# Patient Record
Sex: FEMALE | Race: BLACK OR AFRICAN AMERICAN | Employment: OTHER | URBAN - METROPOLITAN AREA
[De-identification: names, ages, dates, MRNs, and addresses within clinical notes are randomized per-mention and may not be internally consistent; named-entity substitution may affect disease eponyms.]

---

## 2017-01-10 DIAGNOSIS — M19.90 ARTHRITIS: ICD-10-CM

## 2017-01-10 RX ORDER — TRAMADOL HYDROCHLORIDE 50 MG/1
50 TABLET ORAL
Qty: 60 TAB | Refills: 0 | Status: SHIPPED | OUTPATIENT
Start: 2017-01-10 | End: 2017-04-11 | Stop reason: SDUPTHER

## 2017-01-27 ENCOUNTER — HOSPITAL ENCOUNTER (OUTPATIENT)
Dept: LAB | Age: 67
Discharge: HOME | End: 2017-01-27
Payer: MEDICARE

## 2017-01-27 ENCOUNTER — OFFICE VISIT (OUTPATIENT)
Dept: FAMILY MEDICINE CLINIC | Age: 67
End: 2017-01-27

## 2017-01-27 VITALS
BODY MASS INDEX: 31.02 KG/M2 | DIASTOLIC BLOOD PRESSURE: 80 MMHG | WEIGHT: 193 LBS | TEMPERATURE: 98 F | OXYGEN SATURATION: 99 % | HEART RATE: 60 BPM | HEIGHT: 66 IN | SYSTOLIC BLOOD PRESSURE: 128 MMHG

## 2017-01-27 DIAGNOSIS — I11.9 BENIGN HYPERTENSIVE HEART DISEASE WITHOUT HEART FAILURE: ICD-10-CM

## 2017-01-27 DIAGNOSIS — R73.01 ELEVATED FASTING BLOOD SUGAR: ICD-10-CM

## 2017-01-27 DIAGNOSIS — E55.9 VITAMIN D DEFICIENCY: ICD-10-CM

## 2017-01-27 DIAGNOSIS — R52 PAIN OF MULTIPLE SITES: Primary | ICD-10-CM

## 2017-01-27 DIAGNOSIS — E78.5 DYSLIPIDEMIA: ICD-10-CM

## 2017-01-27 DIAGNOSIS — M19.90 ARTHRITIS: ICD-10-CM

## 2017-01-27 DIAGNOSIS — Z51.81 MEDICATION MONITORING ENCOUNTER: ICD-10-CM

## 2017-01-27 LAB
ALBUMIN SERPL BCP-MCNC: 3.6 G/DL (ref 3.4–5)
ALBUMIN/GLOB SERPL: 1.1 {RATIO} (ref 0.8–1.7)
ALP SERPL-CCNC: 48 U/L (ref 45–117)
ALT SERPL-CCNC: 23 U/L (ref 13–56)
ANION GAP BLD CALC-SCNC: 9 MMOL/L (ref 3–18)
AST SERPL W P-5'-P-CCNC: 14 U/L (ref 15–37)
BASOPHILS # BLD AUTO: 0 K/UL (ref 0–0.06)
BASOPHILS # BLD: 0 % (ref 0–2)
BILIRUB SERPL-MCNC: 0.5 MG/DL (ref 0.2–1)
BUN SERPL-MCNC: 15 MG/DL (ref 7–18)
BUN/CREAT SERPL: 20 (ref 12–20)
CALCIUM SERPL-MCNC: 8.9 MG/DL (ref 8.5–10.1)
CHLORIDE SERPL-SCNC: 104 MMOL/L (ref 100–108)
CHOLEST SERPL-MCNC: 190 MG/DL
CO2 SERPL-SCNC: 30 MMOL/L (ref 21–32)
CREAT SERPL-MCNC: 0.75 MG/DL (ref 0.6–1.3)
DIFFERENTIAL METHOD BLD: ABNORMAL
EOSINOPHIL # BLD: 0.2 K/UL (ref 0–0.4)
EOSINOPHIL NFR BLD: 3 % (ref 0–5)
ERYTHROCYTE [DISTWIDTH] IN BLOOD BY AUTOMATED COUNT: 14 % (ref 11.6–14.5)
GLOBULIN SER CALC-MCNC: 3.4 G/DL (ref 2–4)
GLUCOSE SERPL-MCNC: 92 MG/DL (ref 74–99)
HBA1C MFR BLD: 5.9 % (ref 4.2–5.6)
HCT VFR BLD AUTO: 37.6 % (ref 35–45)
HDLC SERPL-MCNC: 78 MG/DL (ref 40–60)
HDLC SERPL: 2.4 {RATIO} (ref 0–5)
HGB BLD-MCNC: 12 G/DL (ref 12–16)
LDLC SERPL CALC-MCNC: 95.4 MG/DL (ref 0–100)
LIPID PROFILE,FLP: ABNORMAL
LYMPHOCYTES # BLD AUTO: 42 % (ref 21–52)
LYMPHOCYTES # BLD: 2.3 K/UL (ref 0.9–3.6)
MAGNESIUM SERPL-MCNC: 2 MG/DL (ref 1.8–2.4)
MCH RBC QN AUTO: 27.3 PG (ref 24–34)
MCHC RBC AUTO-ENTMCNC: 31.9 G/DL (ref 31–37)
MCV RBC AUTO: 85.5 FL (ref 74–97)
MONOCYTES # BLD: 0.5 K/UL (ref 0.05–1.2)
MONOCYTES NFR BLD AUTO: 8 % (ref 3–10)
NEUTS SEG # BLD: 2.5 K/UL (ref 1.8–8)
NEUTS SEG NFR BLD AUTO: 47 % (ref 40–73)
PLATELET # BLD AUTO: 276 K/UL (ref 135–420)
PMV BLD AUTO: 12.4 FL (ref 9.2–11.8)
POTASSIUM SERPL-SCNC: 3.8 MMOL/L (ref 3.5–5.5)
PROT SERPL-MCNC: 7 G/DL (ref 6.4–8.2)
RBC # BLD AUTO: 4.4 M/UL (ref 4.2–5.3)
SODIUM SERPL-SCNC: 143 MMOL/L (ref 136–145)
TRIGL SERPL-MCNC: 83 MG/DL (ref ?–150)
TSH SERPL DL<=0.05 MIU/L-ACNC: 1.31 UIU/ML (ref 0.36–3.74)
VLDLC SERPL CALC-MCNC: 16.6 MG/DL
WBC # BLD AUTO: 5.5 K/UL (ref 4.6–13.2)

## 2017-01-27 PROCEDURE — 82306 VITAMIN D 25 HYDROXY: CPT | Performed by: FAMILY MEDICINE

## 2017-01-27 PROCEDURE — 83735 ASSAY OF MAGNESIUM: CPT | Performed by: FAMILY MEDICINE

## 2017-01-27 PROCEDURE — 84443 ASSAY THYROID STIM HORMONE: CPT | Performed by: FAMILY MEDICINE

## 2017-01-27 PROCEDURE — 85025 COMPLETE CBC W/AUTO DIFF WBC: CPT | Performed by: FAMILY MEDICINE

## 2017-01-27 PROCEDURE — 80061 LIPID PANEL: CPT | Performed by: FAMILY MEDICINE

## 2017-01-27 PROCEDURE — 80053 COMPREHEN METABOLIC PANEL: CPT | Performed by: FAMILY MEDICINE

## 2017-01-27 PROCEDURE — 83036 HEMOGLOBIN GLYCOSYLATED A1C: CPT | Performed by: FAMILY MEDICINE

## 2017-01-27 PROCEDURE — 36415 COLL VENOUS BLD VENIPUNCTURE: CPT | Performed by: FAMILY MEDICINE

## 2017-01-27 RX ORDER — HYDROCODONE BITARTRATE AND ACETAMINOPHEN 5; 325 MG/1; MG/1
1 TABLET ORAL
Qty: 30 TAB | Refills: 0 | Status: SHIPPED | OUTPATIENT
Start: 2017-01-27 | End: 2017-07-18 | Stop reason: SDUPTHER

## 2017-01-27 NOTE — PROGRESS NOTES
Chief Complaint   Patient presents with    Blood sugar problem     prediabetes    Cholesterol Problem     did not get labs done but states she is fasting and can have them done here    Hypertension    Vitamin D Deficiency    Arthritis     joints, states pain med is effective when she takes it, has not taken it today     1. Have you been to the ER, urgent care clinic since your last visit? Hospitalized since your last visit? No    2. Have you seen or consulted any other health care providers outside of the [address] [place] [address] since your last visit? Include any pap smears or colon screening.  No

## 2017-01-27 NOTE — MR AVS SNAPSHOT
Visit Information Date & Time Provider Department Dept. Phone Encounter #  
 1/27/2017  1:45 PM Franco Mcgarry MD [address] [phone] [id number] Follow-up Instructions Return in about 9 weeks (around 3/31/2017) for AWV. Upcoming Health Maintenance Date Due DTaP/Tdap/Td series (1 - Tdap) 3/15/1972 ZOSTER VACCINE AGE 60> 3/15/2011 GLAUCOMA SCREENING Q2Y 3/15/2016 Pneumococcal 65+ Low/Medium Risk (2 of 2 - PCV13) 3/31/2017 MEDICARE YEARLY EXAM 4/1/2017 COLONOSCOPY 1/28/2018 BREAST CANCER SCRN MAMMOGRAM 10/28/2018 Allergies as of 1/27/2017  Review Complete On: 1/27/2017 By: Franco Mcgarry MD  
  
 Severity Noted Reaction Type Reactions Ampicillin  07/28/2010    Hives Crestor [Rosuvastatin]  07/28/2010    Myalgia Lipitor [Atorvastatin]  07/28/2010    Myalgia Zetia [Ezetimibe]  07/28/2010    Myalgia Current Immunizations  Reviewed on 7/12/2016 Name Date Influenza High Dose Vaccine PF 9/15/2016 Influenza Vaccine 10/8/2015, 12/18/2012 Influenza Vaccine PF 12/10/2014, 10/22/2013 Influenza Vaccine Whole 11/3/2010, 12/1/2008 Pneumococcal Polysaccharide (PPSV-23) 3/31/2016 Not reviewed this visit You Were Diagnosed With   
  
 Codes Comments Vitamin D deficiency    -  Primary ICD-10-CM: E55.9 ICD-9-CM: 268.9 Arthritis     ICD-10-CM: M19.90 ICD-9-CM: 716.90 Pain of multiple sites     ICD-10-CM: R52 ICD-9-CM: 780.96 Dyslipidemia     ICD-10-CM: E78.5 ICD-9-CM: 272.4 Benign hypertensive heart disease without heart failure     ICD-10-CM: I11.9 ICD-9-CM: 402.10 Vitals BP Pulse Temp Height(growth percentile) Weight(growth percentile) SpO2  
 128/80 60 98 °F (36.7 °C) (Oral) 5' 6\" (1.676 m) 193 lb (87.5 kg) 99% BMI OB Status Smoking Status 31.15 kg/m2 Postmenopausal Former Smoker Vitals History BMI and BSA Data Body Mass Index Body Surface Area 31.15 kg/m 2 2.02 m 2 Preferred Pharmacy Pharmacy Name Phone Mateo Morejon, [facility] [phone] Your Updated Medication List  
  
   
This list is accurate as of: 1/27/17  2:35 PM.  Always use your most recent med list.  
  
  
  
  
 aspirin delayed-release 81 mg tablet Take 81 mg by mouth daily. colesevelam 625 mg tablet Commonly known as:  [facility] Take 2 Tabs by mouth two (2) times daily (with meals). ergocalciferol 50,000 unit capsule Commonly known as:  ERGOCALCIFEROL Take 50,000 Units by mouth every seven (7) days. ezetimibe 10 mg tablet Commonly known as:  Zelpha Bock Take 1 Tab by mouth daily. HYDROcodone-acetaminophen 5-325 mg per tablet Commonly known as:  Sylvia Trevon Take 1 Tab by mouth every four (4) hours as needed for Pain.  
  
 hyoscyamine SL 0.125 mg SL tablet Commonly known as:  LEVSIN/SL  
0.125 mg by SubLINGual route every four (4) hours as needed for Cramping. IRON PO Take  by mouth two (2) times a day. lansoprazole 30 mg capsule Commonly known as:  PREVACID Take 1 Cap by mouth Daily (before breakfast). lisinopril 10 mg tablet Commonly known as:  Michaelyn [place] Take 1 Tab by mouth two (2) times a day. nitroglycerin 0.4 mg SL tablet Commonly known as:  NITROSTAT  
1 Tab by SubLINGual route every five (5) minutes as needed. PARoxetine mesylate 7.5 mg Cap Commonly known as:  Thedora Croissant Take 1 Cap by mouth daily. PLAVIX PO Take 75 mg by mouth daily. simvastatin 80 mg tablet Commonly known as:  ZOCOR Take 1 Tab by mouth nightly. TOPROL XL 25 mg XL tablet Generic drug:  metoprolol succinate Take 0.5 Tabs by mouth daily. traMADol 50 mg tablet Commonly known as:  ULTRAM  
Take 1 Tab by mouth two (2) times daily as needed for Pain. VITAMIN B-12 1,000 mcg/mL injection Generic drug:  cyanocobalamin 1,000 mcg by IntraMUSCular route once. Prescriptions Printed Refills HYDROcodone-acetaminophen (NORCO) 5-325 mg per tablet 0 Sig: Take 1 Tab by mouth every four (4) hours as needed for Pain. Class: Print Route: Oral  
  
Follow-up Instructions Return in about 9 weeks (around 3/31/2017) for AWV. Patient Instructions Please contact our office if you have any questions about your visit today. Introducing [facility]! Sherri Marley introduces "[employer] patient portal. Now you can access parts of your medical record, email your doctor's office, and request medication refills online. 1. In your internet browser, go to [email or web address] 2. Click on the First Time User? Click Here link in the Sign In box. You will see the New Member Sign Up page. 3. Enter your "[employer] Access Code exactly as it appears below. You will not need to use this code after youve completed the sign-up process. If you do not sign up before the expiration date, you must request a new code. · "[employer] Access Code: [email or web address] Expires: 3/8/2017  2:44 PM 
 
4. Enter the last four digits of your Social Security Number (xxxx) and Date of Birth (mm/dd/yyyy) as indicated and click Submit. You will be taken to the next sign-up page. 5. Create a "[employer] ID. This will be your "[employer] login ID and cannot be changed, so think of one that is secure and easy to remember. 6. Create a "[employer] password. You can change your password at any time. 7. Enter your Password Reset Question and Answer. This can be used at a later time if you forget your password. 8. Enter your e-mail address. You will receive e-mail notification when new information is available in [address] 9. Click Sign Up. You can now view and download portions of your medical record. 10. Click the Download Summary menu link to download a portable copy of your medical information. If you have questions, please visit the Frequently Asked Questions section of the [employer] website. Remember, [employer] is NOT to be used for urgent needs. For medical emergencies, dial 911. Now available from your iPhone and Android! Please provide this summary of care documentation to your next provider. Your primary care clinician is listed as SALINAS ALMARAZ. If you have any questions after today's visit, please call [phone].

## 2017-01-27 NOTE — PROGRESS NOTES
HISTORY OF PRESENT ILLNESS  Carter Patino is a 73 y.o. female. Chief Complaint   Patient presents with    Blood sugar problem chronic problem, stable      prediabetes    Cholesterol Problem Chronic problem, control uncertain, labs past due. At times uncontrolled       did not get labs done but states she is fasting and can have them done here    Hypertension    Vitamin D Deficiency    Arthritis     joints, states pain med is effective when she takes it, has not taken it today. Worse with cold weather, norco for more severe pain, ultram for milder pain, both are effective using prn pain level 10/10 today with cold today achy. HPI  Past Medical History   Diagnosis Date    Abnormal nuclear cardiac imaging test 03/23/2009     Moderate inferior infarction w/o ischemia. Mild anterior artifact. EF 55%. Mild inferior hypk. Neg EKG on max EST. Ex time 7:30.    Arthritis     Breast cancer [facility])      resection '06;  chemotherapy;  XRT    Coronary artery disease      RCA - 2.75 x 15mm ACS ZETA x2 5/03    Dyslipidemia     GERD     History of echocardiogram 11/24/2008     EF 61% (3D). No clear-cut WMA. Gr 1 DDfx. Mild LAE. Mild MR. Mild TR. PASP 40.      Hypertension     Lung cancer [facility])      resection '09;  no chemotherapy,  no XRT    S/P cardiac cath 05/27/2003     [facility]ici:  Prox RCA mild. Prior stent patent. LM  patent. pLAD 20%. CX patent. Current Outpatient Prescriptions   Medication Sig Dispense Refill    traMADol (ULTRAM) 50 mg tablet Take 1 Tab by mouth two (2) times daily as needed for Pain. 60 Tab 0    HYDROcodone-acetaminophen (NORCO) 5-325 mg per tablet Take 1 Tab by mouth every four (4) hours as needed for Pain. 30 Tab 0    cyanocobalamin (VITAMIN B-12) 1,000 mcg/mL injection 1,000 mcg by IntraMUSCular route once.  lansoprazole (PREVACID) 30 mg capsule Take 1 Cap by mouth Daily (before breakfast).  90 Cap 3    ezetimibe (ZETIA) 10 mg tablet Take 1 Tab by mouth daily. 90 Tab 3    simvastatin (ZOCOR) 80 mg tablet Take 1 Tab by mouth nightly. 90 Tab 3    ergocalciferol (ERGOCALCIFEROL) 50,000 unit capsule Take 50,000 Units by mouth every seven (7) days. 0    nitroglycerin (NITROSTAT) 0.4 mg SL tablet 1 Tab by SubLINGual route every five (5) minutes as needed. 30 Tab 1    PARoxetine mesylate (BRISDELLE) 7.5 mg cap Take 1 Cap by mouth daily. 30 Cap 1    hyoscyamine SL (LEVSIN/SL) 0.125 mg SL tablet 0.125 mg by SubLINGual route every four (4) hours as needed for Cramping.  CLOPIDOGREL BISULFATE (PLAVIX PO) Take 75 mg by mouth daily.  colesevelam (WELCHOL) 625 mg tablet Take 2 Tabs by mouth two (2) times daily (with meals). 120 Tab 6    lisinopril (PRINIVIL, ZESTRIL) 10 mg tablet Take 1 Tab by mouth two (2) times a day. 180 Tab 3    TOPROL XL 25 mg XL tablet Take 0.5 Tabs by mouth daily. 45 Tab 3    aspirin delayed-release 81 mg tablet Take 81 mg by mouth daily.  FERROUS FUMARATE (IRON PO) Take  by mouth two (2) times a day. Allergies   Allergen Reactions    Ampicillin Hives    Crestor [Rosuvastatin] Myalgia    Lipitor [Atorvastatin] Myalgia    Zetia [Ezetimibe] Myalgia      ROS Respiratory: Negative for shortness of breath. Cardiovascular: Negative for chest pain. Genitourinary: Negative for frequency. Neurological: Negative for dizziness and headaches. Visit Vitals    /80  Comment: manual    Pulse 60    Temp 98 °F (36.7 °C) (Oral)    Ht 5' 6\" (1.676 m)    Wt 193 lb (87.5 kg)    SpO2 99%    BMI 31.15 kg/m2       Physical Exam  Nursing note and vitals reviewed. Constitutional: She is oriented to person, place, and time. She appears well-developed and well-nourished. No distress. HENT:   Mouth/Throat: Oropharynx is clear and moist.   Neck: No JVD present. No thyromegaly present. Cardiovascular: Normal rate, regular rhythm and normal heart sounds.     Pulmonary/Chest: Effort normal and breath sounds normal. No respiratory distress. She has no wheezes. She has no rales. Musculoskeletal: She exhibits no edema. Lymphadenopathy:     She has no cervical adenopathy. Neurological: She is alert and oriented to person, place, and time. Coordination normal.   Psychiatric: She has a normal mood and affect. Her behavior is normal.     ASSESSMENT and PLAN    ICD-10-CM ICD-9-CM    1. Pain of multiple sites uncontrolled exacerbation of refilled norco R52 780.96 HYDROcodone-acetaminophen (NORCO) 5-325 mg per tablet   2. Arthritis M19.90 716.90 HYDROcodone-acetaminophen (NORCO) 5-325 mg per tablet   3. Hypertension stable continue current medications improved on leticia I11.9 402.10    4. Vitamin D deficiency E55.9 268.9    5. Dyslipidemia Chronic problem, control uncertain, labs past due. E78.5 272.4    6. Elevated fasting blood sugar R73.01 790.21    Labs drawn in office today call back for results  Follow-up Disposition:  Return in about 9 weeks (around 3/31/2017) for AWV.

## 2017-01-28 LAB — 25(OH)D3 SERPL-MCNC: 43.9 NG/ML (ref 30–100)

## 2017-02-01 ENCOUNTER — TELEPHONE (OUTPATIENT)
Dept: FAMILY MEDICINE CLINIC | Age: 67
End: 2017-02-01

## 2017-02-06 ENCOUNTER — TELEPHONE (OUTPATIENT)
Dept: FAMILY MEDICINE CLINIC | Age: 67
End: 2017-02-06

## 2017-02-06 NOTE — TELEPHONE ENCOUNTER
Patient called and stated that she would like an order for an echo cardiogram due to shortness of breath

## 2017-04-10 DIAGNOSIS — M19.90 ARTHRITIS: ICD-10-CM

## 2017-04-13 RX ORDER — TRAMADOL HYDROCHLORIDE 50 MG/1
50 TABLET ORAL
Qty: 60 TAB | Refills: 0 | Status: SHIPPED | OUTPATIENT
Start: 2017-04-13 | End: 2017-06-06 | Stop reason: SDUPTHER

## 2017-04-25 ENCOUNTER — OFFICE VISIT (OUTPATIENT)
Dept: FAMILY MEDICINE CLINIC | Age: 67
End: 2017-04-25

## 2017-04-25 ENCOUNTER — HOSPITAL ENCOUNTER (OUTPATIENT)
Dept: LAB | Age: 67
Discharge: HOME | End: 2017-04-25
Payer: MEDICARE

## 2017-04-25 VITALS
HEART RATE: 54 BPM | RESPIRATION RATE: 20 BRPM | WEIGHT: 198.5 LBS | TEMPERATURE: 97.3 F | HEIGHT: 66 IN | SYSTOLIC BLOOD PRESSURE: 160 MMHG | DIASTOLIC BLOOD PRESSURE: 80 MMHG | BODY MASS INDEX: 31.9 KG/M2

## 2017-04-25 DIAGNOSIS — Z12.11 SCREEN FOR COLON CANCER: ICD-10-CM

## 2017-04-25 DIAGNOSIS — R92.8 ABNORMAL MAMMOGRAM: ICD-10-CM

## 2017-04-25 DIAGNOSIS — E78.5 HYPERLIPIDEMIA, UNSPECIFIED HYPERLIPIDEMIA TYPE: ICD-10-CM

## 2017-04-25 DIAGNOSIS — I11.9 BENIGN HYPERTENSIVE HEART DISEASE WITHOUT HEART FAILURE: ICD-10-CM

## 2017-04-25 DIAGNOSIS — R10.9 RIGHT FLANK PAIN: ICD-10-CM

## 2017-04-25 DIAGNOSIS — Z71.89 ACP (ADVANCE CARE PLANNING): ICD-10-CM

## 2017-04-25 DIAGNOSIS — Z00.00 ROUTINE GENERAL MEDICAL EXAMINATION AT A HEALTH CARE FACILITY: Primary | ICD-10-CM

## 2017-04-25 DIAGNOSIS — Z13.820 SCREENING FOR OSTEOPOROSIS: ICD-10-CM

## 2017-04-25 DIAGNOSIS — R31.9 HEMATURIA: ICD-10-CM

## 2017-04-25 DIAGNOSIS — R10.9 ACUTE RIGHT FLANK PAIN: ICD-10-CM

## 2017-04-25 DIAGNOSIS — G89.4 CHRONIC PAIN SYNDROME: ICD-10-CM

## 2017-04-25 DIAGNOSIS — Z78.0 POSTMENOPAUSAL: ICD-10-CM

## 2017-04-25 DIAGNOSIS — E78.5 DYSLIPIDEMIA: ICD-10-CM

## 2017-04-25 DIAGNOSIS — Z51.81 MEDICATION MONITORING ENCOUNTER: ICD-10-CM

## 2017-04-25 DIAGNOSIS — R73.01 ELEVATED FASTING BLOOD SUGAR: ICD-10-CM

## 2017-04-25 DIAGNOSIS — M19.90 ARTHRITIS: ICD-10-CM

## 2017-04-25 DIAGNOSIS — Z13.39 SCREENING FOR ALCOHOLISM: ICD-10-CM

## 2017-04-25 DIAGNOSIS — Z13.31 SCREENING FOR DEPRESSION: ICD-10-CM

## 2017-04-25 DIAGNOSIS — Z85.3 HISTORY OF BREAST CANCER: ICD-10-CM

## 2017-04-25 DIAGNOSIS — Z71.89 ADVANCED DIRECTIVES, COUNSELING/DISCUSSION: ICD-10-CM

## 2017-04-25 LAB
BILIRUB UR QL STRIP: NEGATIVE
GLUCOSE UR-MCNC: NEGATIVE MG/DL
KETONES P FAST UR STRIP-MCNC: NORMAL MG/DL
PH UR STRIP: 5.5 [PH] (ref 4.6–8)
PROT UR QL STRIP: NEGATIVE MG/DL
SP GR UR STRIP: 1.02 (ref 1–1.03)
UA UROBILINOGEN AMB POC: NORMAL (ref 0.2–1)
URINALYSIS CLARITY POC: CLEAR
URINALYSIS COLOR POC: YELLOW
URINE BLOOD POC: NORMAL
URINE LEUKOCYTES POC: NEGATIVE
URINE NITRITES POC: NEGATIVE

## 2017-04-25 PROCEDURE — 87086 URINE CULTURE/COLONY COUNT: CPT | Performed by: FAMILY MEDICINE

## 2017-04-25 RX ORDER — LISINOPRIL 20 MG/1
TABLET ORAL DAILY
COMMUNITY
End: 2017-11-21 | Stop reason: SDUPTHER

## 2017-04-25 NOTE — MR AVS SNAPSHOT
Visit Information Date & Time Provider Department Dept. Phone Encounter #  
 4/25/2017  2:00 PM Sarah AponteAndrea 71 [phone] [id number] Follow-up Instructions Return in about 6 weeks (around 6/6/2017). Upcoming Health Maintenance Date Due DTaP/Tdap/Td series (1 - Tdap) 3/15/1972 ZOSTER VACCINE AGE 60> 3/15/2011 GLAUCOMA SCREENING Q2Y 3/15/2016 Pneumococcal 65+ Low/Medium Risk (2 of 2 - PCV13) 3/31/2017 MEDICARE YEARLY EXAM 4/1/2017 COLONOSCOPY 1/28/2018 BREAST CANCER SCRN MAMMOGRAM 10/28/2018 Allergies as of 4/25/2017  Review Complete On: 4/25/2017 By: Sarah Aponte MD  
  
 Severity Noted Reaction Type Reactions Ampicillin  07/28/2010    Hives Crestor [Rosuvastatin]  07/28/2010    Myalgia Lipitor [Atorvastatin]  07/28/2010    Myalgia Zetia [Ezetimibe]  07/28/2010    Myalgia Current Immunizations  Reviewed on 7/12/2016 Name Date Influenza High Dose Vaccine PF 9/15/2016 Influenza Vaccine 10/8/2015, 12/18/2012 Influenza Vaccine PF 12/10/2014, 10/22/2013 Influenza Vaccine Whole 11/3/2010, 12/1/2008 Pneumococcal Polysaccharide (PPSV-23) 3/31/2016 Not reviewed this visit You Were Diagnosed With   
  
 Codes Comments Routine general medical examination at a health care facility    -  Primary ICD-10-CM: Z00.00 ICD-9-CM: V70.0 Screening for depression     ICD-10-CM: Z13.89 ICD-9-CM: V79.0 Screen for colon cancer     ICD-10-CM: Z12.11 ICD-9-CM: V76.51 Screening for alcoholism     ICD-10-CM: Z13.89 ICD-9-CM: V79.1 Dyslipidemia     ICD-10-CM: E78.5 ICD-9-CM: 272.4 Benign hypertensive heart disease without heart failure     ICD-10-CM: I11.9 ICD-9-CM: 402.10 Postmenopausal     ICD-10-CM: Z78.0 ICD-9-CM: V49.81 Screening for osteoporosis     ICD-10-CM: Z13.820 ICD-9-CM: V82.81  Advanced directives, counseling/discussion     ICD-10-CM: Z71.89 
 ICD-9-CM: V65.49   
 ACP (advance care planning)     ICD-10-CM: Z71.89 ICD-9-CM: V65.49 Acute right flank pain     ICD-10-CM: R10.9 ICD-9-CM: 789.09, 338.19 Right flank pain     ICD-10-CM: R10.9 ICD-9-CM: 789.09 Hematuria     ICD-10-CM: R31.9 ICD-9-CM: 599.70 Chronic pain syndrome     ICD-10-CM: G89.4 ICD-9-CM: 338.4 Arthritis     ICD-10-CM: M19.90 ICD-9-CM: 716.90 Abnormal mammogram     ICD-10-CM: R92.8 ICD-9-CM: 793.80 History of breast cancer     ICD-10-CM: Z85.3 ICD-9-CM: V10.3 Medication monitoring encounter     ICD-10-CM: Z51.81 
ICD-9-CM: V58.83 Elevated fasting blood sugar     ICD-10-CM: R73.01 
ICD-9-CM: 790.21 Hyperlipidemia, unspecified hyperlipidemia type     ICD-10-CM: E78.5 ICD-9-CM: 272.4 Vitals BP Pulse Temp Resp Height(growth percentile) Weight(growth percentile) 160/80 (BP 1 Location: Right arm, BP Patient Position: Sitting) (!) 54 97.3 °F (36.3 °C) (Oral) 20 5' 6\" (1.676 m) 198 lb 8 oz (90 kg) BMI OB Status Smoking Status 32.04 kg/m2 Postmenopausal Former Smoker Vitals History BMI and BSA Data Body Mass Index Body Surface Area 32.04 kg/m 2 2.05 m 2 Preferred Pharmacy Pharmacy Name Phone Mateo Morejon, [address] [facility] [phone] Your Updated Medication List  
  
   
This list is accurate as of: 4/25/17  3:18 PM.  Always use your most recent med list.  
  
  
  
  
 aspirin delayed-release 81 mg tablet Take 81 mg by mouth daily. ergocalciferol 50,000 unit capsule Commonly known as:  ERGOCALCIFEROL Take 50,000 Units by mouth every seven (7) days. ezetimibe 10 mg tablet Commonly known as:  Meme Carrie Take 1 Tab by mouth daily. HYDROcodone-acetaminophen 5-325 mg per tablet Commonly known as:  Claudio Gen Take 1 Tab by mouth every four (4) hours as needed for Pain. IRON PO Take  by mouth two (2) times a day. lansoprazole 30 mg capsule Commonly known as:  PREVACID Take 1 Cap by mouth Daily (before breakfast). lisinopril 20 mg tablet Commonly known as:  Minus Salk Take  by mouth daily. nitroglycerin 0.4 mg SL tablet Commonly known as:  NITROSTAT  
1 Tab by SubLINGual route every five (5) minutes as needed. PARoxetine mesylate 7.5 mg Cap Commonly known as:  Wylene Grosser Take 1 Cap by mouth daily. TOPROL XL 25 mg XL tablet Generic drug:  metoprolol succinate Take 0.5 Tabs by mouth daily. traMADol 50 mg tablet Commonly known as:  ULTRAM  
Take 1 Tab by mouth two (2) times daily as needed for Pain. We Performed the Following ADVANCE CARE PLANNING FIRST 30 MINS [51262 CPT(R)] AMB POC URINALYSIS DIP STICK AUTO W/O MICRO [60708 CPT(R)] [facility] 68 [[id number] [facility]] Follow-up Instructions Return in about 6 weeks (around 6/6/2017). To-Do List   
 04/25/2017 Microbiology:  CULTURE, URINE   
  
 04/25/2017 Imaging:  MAXIMO MAMMO LT DX INCL CAD   
  
 04/25/2017 Lab:  OCCULT BLOOD, IMMUNOASSAY (FIT)   
  
 04/25/2017 Imaging:  XR ABD (AP AND ERECT OR DECUB)   
  
 04/28/2017 Imaging:  DEXA BONE DENSITY STUDY AXIAL   
  
 06/08/2017 Lab:  HEMOGLOBIN A1C W/O EAG   
  
 06/08/2017 Lab:  LIPID PANEL   
  
 06/08/2017 Lab:  METABOLIC PANEL, COMPREHENSIVE Patient Instructions Please contact our office if you have any questions about your visit today. Medicare Part B Preventive Services Limitations Recommendation Scheduled Bone Mass Measurement 
(age 72 & older, biennial) Requires diagnosis related to osteoporosis or estrogen deficiency. Biennial benefit unless patient has history of long-term glucocorticoid tx or baseline is needed because initial test was by other method Cardiovascular Screening Blood Tests (every 5 years) Total cholesterol, HDL, Triglycerides Order as a panel if possible Colorectal Cancer Screening 
-Fecal occult blood test (annual) -Flexible sigmoidoscopy (5y) 
-Screening colonoscopy (10y) -Barium Enema Counseling to Prevent Tobacco Use (up to 8 sessions per year) - Counseling greater than 3 and up to 10 minutes - Counseling greater than 10 minutes Patients must be asymptomatic of tobacco-related conditions to receive as preventive service Diabetes Screening Tests (at least every 3 years, Medicare covers annually or at 6-month intervals for prediabetic patients) Fasting blood sugar (FBS) or glucose tolerance test (GTT) Patient must be diagnosed with one of the following: 
-Hypertension, Dyslipidemia, obesity, previous impaired FBS or GTT 
Or any two of the following: overweight, FH of diabetes, age ? 72, history of gestational diabetes, birth of baby weighing more than 9 pounds Diabetes Self-Management Training (DSMT) (no USPSTF recommendation) Requires referral by treating physician for patient with diabetes or renal disease. 10 hours of initial DSMT session of no less than 30 minutes each in a continuous 12-month period. 2 hours of follow-up DSMT in subsequent years. Glaucoma Screening (no USPSTF recommendation) Diabetes mellitus, family history, , age 48 or over,  American, age 72 or over Human Immunodeficiency Virus (HIV) Screening (annually for increased risk patients) HIV-1 and HIV-2 by EIA, ROXANNE, rapid antibody test, or oral mucosa transudate Patient must be at increased risk for HIV infection per USPSTF guidelines or pregnant. Tests covered annually for patients at increased risk. Pregnant patients may receive up to 3 test during pregnancy.     
Medical Nutrition Therapy (MNT) (for diabetes or renal disease not recommended schedule) Requires referral by treating physician for patient with diabetes or renal disease. Can be provided in same year as diabetes self-management training (DSMT), and CMS recommends medical nutrition therapy take place after DSMT. Up to 3 hours for initial year and 2 hours in subsequent years. Shingles Vaccination A shingles vaccine is also recommended once in a lifetime after age 61 Seasonal Influenza Vaccination (annually) Pneumococcal Vaccination (once after 65) Hepatitis B Vaccinations (if medium/high risk) Medium/high risk factors:  End-stage renal disease, Hemophiliacs who received Factor VIII or IX concentrates, Clients of institutions for the mentally retarded, Persons who live in the same house as a HepB virus carrier, Homosexual men, Illicit injectable drug abusers. Screening Mammography (biennial age 54-69) Annually (age 36 or over) Screening Pap Tests and Pelvic Examination (up to age 79 and after 79 if unknown history or abnormal study last 10 years) Every 24 months except high risk Ultrasound Screening for Abdominal Aortic Aneurysm (AAA) (once) Patient must be referred through IPPE and not have had a screening for abdominal aortic aneurysm before under Medicare. Limited to patients who meet one of the following criteria: 
- Men who are 73-68 years old and have smoked more than 100 cigarettes in their lifetime. 
-Anyone with a FH of AAA 
-Anyone recommended for screening by USPSTF Introducing [facility]! Nona Lin introduces [employer] patient portal. Now you can access parts of your medical record, email your doctor's office, and request medication refills online. 1. In your internet browser, go to [email or web address] 2. Click on the First Time User? Click Here link in the Sign In box. You will see the New Member Sign Up page. 3. Enter your [employer] Access Code exactly as it appears below. You will not need to use this code after youve completed the sign-up process.  If you do not sign up before the expiration date, you must request a new code. · [facility] Access Code: [email or web address] Expires: 7/24/2017  3:15 PM 
 
4. Enter the last four digits of your Social Security Number (xxxx) and Date of Birth (mm/dd/yyyy) as indicated and click Submit. You will be taken to the next sign-up page. 5. Create a [facility] ID. This will be your [facility] login ID and cannot be changed, so think of one that is secure and easy to remember. 6. Create a [facility] password. You can change your password at any time. 7. Enter your Password Reset Question and Answer. This can be used at a later time if you forget your password. 8. Enter your e-mail address. You will receive e-mail notification when new information is available in [address] 9. Click Sign Up. You can now view and download portions of your medical record. 10. Click the Download Summary menu link to download a portable copy of your medical information. If you have questions, please visit the Frequently Asked Questions section of the [employer] website. Remember, [facility] is NOT to be used for urgent needs. For medical emergencies, dial 911. Now available from your iPhone and Android! Please provide this summary of care documentation to your next provider. Your primary care clinician is listed as SALINAS ALMARAZ. If you have any questions after today's visit, please call [phone].

## 2017-04-25 NOTE — PATIENT INSTRUCTIONS
Please contact our office if you have any questions about your visit today. Medicare Part B Preventive Services Limitations Recommendation Scheduled   Bone Mass Measurement  (age 72 & older, biennial) Requires diagnosis related to osteoporosis or estrogen deficiency. Biennial benefit unless patient has history of long-term glucocorticoid tx or baseline is needed because initial test was by other method     Cardiovascular Screening Blood Tests (every 5 years)  Total cholesterol, HDL, Triglycerides Order as a panel if possible     Colorectal Cancer Screening  -Fecal occult blood test (annual)  -Flexible sigmoidoscopy (5y)  -Screening colonoscopy (10y)  -Barium Enema      Counseling to Prevent Tobacco Use (up to 8 sessions per year)  - Counseling greater than 3 and up to 10 minutes  - Counseling greater than 10 minutes Patients must be asymptomatic of tobacco-related conditions to receive as preventive service     Diabetes Screening Tests (at least every 3 years, Medicare covers annually or at 6-month intervals for prediabetic patients)    Fasting blood sugar (FBS) or glucose tolerance test (GTT) Patient must be diagnosed with one of the following:  -Hypertension, Dyslipidemia, obesity, previous impaired FBS or GTT  Or any two of the following: overweight, FH of diabetes, age ? 72, history of gestational diabetes, birth of baby weighing more than 9 pounds     Diabetes Self-Management Training (DSMT) (no USPSTF recommendation) Requires referral by treating physician for patient with diabetes or renal disease. 10 hours of initial DSMT session of no less than 30 minutes each in a continuous 12-month period. 2 hours of follow-up DSMT in subsequent years.      Glaucoma Screening (no USPSTF recommendation) Diabetes mellitus, family history, , age 48 or over,  American, age 72 or over     Human Immunodeficiency Virus (HIV) Screening (annually for increased risk patients)  HIV-1 and HIV-2 by EIA, ROXANNE, rapid antibody test, or oral mucosa transudate Patient must be at increased risk for HIV infection per USPSTF guidelines or pregnant. Tests covered annually for patients at increased risk. Pregnant patients may receive up to 3 test during pregnancy. Medical Nutrition Therapy (MNT) (for diabetes or renal disease not recommended schedule) Requires referral by treating physician for patient with diabetes or renal disease. Can be provided in same year as diabetes self-management training (DSMT), and CMS recommends medical nutrition therapy take place after DSMT. Up to 3 hours for initial year and 2 hours in subsequent years. Shingles Vaccination A shingles vaccine is also recommended once in a lifetime after age 61     Seasonal Influenza Vaccination (annually)      Pneumococcal Vaccination (once after 72)      Hepatitis B Vaccinations (if medium/high risk) Medium/high risk factors:  End-stage renal disease,  Hemophiliacs who received Factor VIII or IX concentrates, Clients of institutions for the mentally retarded, Persons who live in the same house as a HepB virus carrier, Homosexual men, Illicit injectable drug abusers. Screening Mammography (biennial age 54-69) Annually (age 36 or over)     Screening Pap Tests and Pelvic Examination (up to age 79 and after 79 if unknown history or abnormal study last 10 years) Every 25 months except high risk     Ultrasound Screening for Abdominal Aortic Aneurysm (AAA) (once) Patient must be referred through [facility] and not have had a screening for abdominal aortic aneurysm before under Medicare.   Limited to patients who meet one of the following criteria:  - Men who are 73-68 years old and have smoked more than 100 cigarettes in their lifetime.  -Anyone with a FH of AAA  -Anyone recommended for screening by USPSTF

## 2017-04-25 NOTE — ACP (ADVANCE CARE PLANNING)
Advance Care Planning      Advance Care Planning    Advance Care Planning (ACP) Provider Note - Comprehensive     Date of ACP Conversation: 04/25/17  Persons included in Conversation:  patient  Length of ACP Conversation in minutes:  16 minutes    Authorized Decision Maker (if patient is incapable of making informed decisions): This person is:  none          General ACP for ALL Patients with Decision Making Capacity:   Importance of advance care planning, including choosing a healthcare agent to communicate patient's healthcare decisions if patient lost the ability to make decisions, such as after a sudden illness or accident  Understanding of the healthcare agent role was assessed and information provided  Exploration of values, goals, and preferences if recovery is not expected, even with continued medical treatment in the event of: Imminent death  Severe, permanent brain injury  Opportunity offered to explore how cultural, Scientology, spiritual, or personal beliefs would affect decisions for future care     Review of Existing Advance Directive:  none    For Serious or Chronic Illness:  No known illness    Interventions Provided:  Recommended completion of Advance Directive form after review of ACP materials and conversation with prospective healthcare agent   Recommended communicating the plan and making copies for the healthcare agent, personal physician, and others as appropriate (e.g., health system)  Recommended review of completed ACP document annually or upon change in health status Discussed in detail [address] with patient.  Patient seems to have decided on no heroic measures if terminally ill, but wants care if deemed appropriate, at the present time and handouts given for pt complete disposition

## 2017-04-25 NOTE — PROGRESS NOTES
Chief Complaint   Patient presents with   [address] Visit     Medicare       Health Maintenance reviewed     1. Have you been to the ER, urgent care clinic since your last visit? Hospitalized since your last visit? No    2. Have you seen or consulted any other health care providers outside of the [address] [place] [address] since your last visit? Include any pap smears or colon screening. No    This is a Subsequent Medicare Annual Wellness Visit providing Personalized Prevention Plan Services (PPPS) (Performed 12 months after initial AWV and PPPS )    I have reviewed the patient's medical history in detail and updated the computerized patient record. History     Past Medical History:   Diagnosis Date    Abnormal nuclear cardiac imaging test 03/23/2009    Moderate inferior infarction w/o ischemia. Mild anterior artifact. EF 55%. Mild inferior hypk. Neg EKG on max EST. Ex time 7:30.    Arthritis     Breast cancer [facility])     resection '06;  chemotherapy;  XRT    Coronary artery disease     RCA - 2.75 x 15mm ACS ZETA x2 5/03    Dyslipidemia     GERD     History of echocardiogram 11/24/2008    EF 61% (3D). No clear-cut WMA. Gr 1 DDfx. Mild LAE. Mild MR. Mild TR. PASP 40.      Hypertension     Lung cancer [facility])     resection '09;  no chemotherapy,  no XRT    S/P cardiac cath 05/27/2003    [facility]:  Prox RCA mild. Prior stent patent. LM  patent. pLAD 20%. CX patent. Past Surgical History:   Procedure Laterality Date    HX BREAST LUMPECTOMY      7/06  left    HX LOBECTOMY      '09 [facility]     Current Outpatient Prescriptions   Medication Sig Dispense Refill    lisinopril (PRINIVIL, ZESTRIL) 20 mg tablet Take  by mouth daily.  traMADol (ULTRAM) 50 mg tablet Take 1 Tab by mouth two (2) times daily as needed for Pain. 60 Tab 0    HYDROcodone-acetaminophen (NORCO) 5-325 mg per tablet Take 1 Tab by mouth every four (4) hours as needed for Pain.  30 Tab 0    lansoprazole (PREVACID) 30 mg capsule Take 1 Cap by mouth Daily (before breakfast). 90 Cap 3    ezetimibe (ZETIA) 10 mg tablet Take 1 Tab by mouth daily. 90 Tab 3    ergocalciferol (ERGOCALCIFEROL) 50,000 unit capsule Take 50,000 Units by mouth every seven (7) days. 0    nitroglycerin (NITROSTAT) 0.4 mg SL tablet 1 Tab by SubLINGual route every five (5) minutes as needed. 30 Tab 1    PARoxetine mesylate (BRISDELLE) 7.5 mg cap Take 1 Cap by mouth daily. 30 Cap 1    TOPROL XL 25 mg XL tablet Take 0.5 Tabs by mouth daily. 45 Tab 3    aspirin delayed-release 81 mg tablet Take 81 mg by mouth daily.  FERROUS FUMARATE (IRON PO) Take  by mouth two (2) times a day. Allergies   Allergen Reactions    Ampicillin Hives    Crestor [Rosuvastatin] Myalgia    Lipitor [Atorvastatin] Myalgia    Zetia [Ezetimibe] Myalgia     Family History   Problem Relation Age of Onset    Hypertension Mother     Other Father      cirrhosis    Diabetes Father     Heart Disease Sister      CABG    Hypertension Sister      Social History   Substance Use Topics    Smoking status: Former Smoker     Packs/day: 1.00     Years: 20.00     Types: Cigarettes     Quit date: 1/1/2003    Smokeless tobacco: Never Used    Alcohol use No     Patient Active Problem List   Diagnosis Code    Hypertension I11.9    Dyslipidemia E78.5    Coronary artery disease I25.10    Vitamin D deficiency E55.9    Osteopenia M85.80    History of breast cancer Z85.3    Colon polyps K63.5    History of lung cancer Z85. 118    ACP (advance care planning) Z71.89       Depression Risk Factor Screening:     PHQ 2 / 9, over the last two weeks 4/25/2017   Little interest or pleasure in doing things Not at all   Feeling down, depressed or hopeless Not at all   Total Score PHQ 2 0     Alcohol Risk Factor Screening: On any occasion during the past 3 months, have you had more than 3 drinks containing alcohol? No    Do you average more than 7 drinks per week?   No      Functional Ability and Level of Safety:     Hearing Loss   none    Activities of Daily Living   Self-care. Requires assistance with: no ADLs    Fall Risk     Fall Risk Assessment, last 12 mths 4/25/2017   Able to walk? Yes   Fall in past 12 months? Yes   Fall with injury? Yes   Number of falls in past 12 months 1   Fall Risk Score 2     Abuse Screen   Patient is not abused    Review of Systems   See additional note    Physical Examination     Evaluation of Cognitive Function:  Mood/affect:  neutral  Appearance: age appropriate  Family member/caregiver input: no    Nursing note and vitals reviewed. Constitutional: She is oriented to person, place, and time. She appears well-developed and well-nourished. No distress. HENT:   Mouth/Throat: Oropharynx is clear and moist.   Neck: No JVD present. No thyromegaly present. Cardiovascular: Normal rate, regular rhythm and normal heart sounds. Pulmonary/Chest: Effort normal and breath sounds normal. No respiratory distress. She has no wheezes. She has no rales. Musculoskeletal: She exhibits no edema. Lymphadenopathy:     She has no cervical adenopathy. Neurological: She is alert and oriented to person, place, and time. Coordination normal.   Psychiatric: She has a normal mood and affect. Her behavior is normal.     Patient Care Team:  Mita Lua MD as PCP - General    Advice/Referrals/Counseling   Education and counseling provided:  Are appropriate based on today's review and evaluation  End-of-Life planning (with patient's consent)  Pneumococcal Vaccine  Influenza Vaccine  Screening Mammography  Colorectal cancer screening tests  Bone mass measurement (DEXA)      Assessment/Plan       ICD-10-CM ICD-9-CM    1. Routine general medical examination at a health care facility Z00.00 V70.0    2. Screening for depression Z13.89 V79.0 DEPRESSION SCREEN ANNUAL   3. Screen for colon cancer Z12.11 V76.51    4. Screening for alcoholism Z13.89 V79.1    5.  Dyslipidemia E78.5 272.4 6. Hypertension I11.9 402.10    7. Postmenopausal Z78.0 V49.81 DEXA BONE DENSITY STUDY AXIAL   8. Screening for osteoporosis Z13.820 V82.81 OCCULT BLOOD, IMMUNOASSAY (FIT)      DEXA BONE DENSITY STUDY AXIAL   9. Advanced directives, counseling/discussion Z71.89 V65.49 ADVANCE CARE PLANNING FIRST 30 MINS   10.  ACP (advance care planning) Z71.89 V65.49 ADVANCE CARE PLANNING FIRST 30 MINS

## 2017-04-25 NOTE — PROGRESS NOTES
HISTORY OF PRESENT ILLNESS  Carter Bettencourt is a 78 y.o. female. hyperlipidemia chronic problem, stable but increased some since last visit, pt not taking med as regularly as before  htn Chronic problem, uncontrolled today Reports compliance with meds Asymptomatic, no headache or dizziness. chronic pain arthritis, chronic problem, stable prn tramadol helpful, if severe pain norco effectie, cannot rate pain today  history of breast cancer abn mammo previously needs follow up  New problem complains of pain right flank, back area, noted for 3-4 days never had before, no cough or other associated sx, denies gas or abd pain    HPI  Past Medical History:   Diagnosis Date    Abnormal nuclear cardiac imaging test 03/23/2009    Moderate inferior infarction w/o ischemia. Mild anterior artifact. EF 55%. Mild inferior hypk. Neg EKG on max EST. Ex time 7:30.    Arthritis     Breast cancer [facility])     resection '06;  chemotherapy;  XRT    Coronary artery disease     RCA - 2.75 x 15mm ACS ZETA x2 5/03    Dyslipidemia     GERD     History of echocardiogram 11/24/2008    EF 61% (3D). No clear-cut WMA. Gr 1 DDfx. Mild LAE. Mild MR. Mild TR. PASP 40.      Hypertension     Lung cancer [facility])     resection '09;  no chemotherapy,  no XRT    S/P cardiac cath 05/27/2003    [facility]:  Prox RCA mild. Prior stent patent. LM  patent. pLAD 20%. CX patent. Current Outpatient Prescriptions   Medication Sig Dispense Refill    lisinopril (PRINIVIL, ZESTRIL) 20 mg tablet Take  by mouth daily.  traMADol (ULTRAM) 50 mg tablet Take 1 Tab by mouth two (2) times daily as needed for Pain. 60 Tab 0    HYDROcodone-acetaminophen (NORCO) 5-325 mg per tablet Take 1 Tab by mouth every four (4) hours as needed for Pain. 30 Tab 0    lansoprazole (PREVACID) 30 mg capsule Take 1 Cap by mouth Daily (before breakfast). 90 Cap 3    ezetimibe (ZETIA) 10 mg tablet Take 1 Tab by mouth daily.  90 Tab 3    ergocalciferol (ERGOCALCIFEROL) 50,000 unit capsule Take 50,000 Units by mouth every seven (7) days. 0    nitroglycerin (NITROSTAT) 0.4 mg SL tablet 1 Tab by SubLINGual route every five (5) minutes as needed. 30 Tab 1    PARoxetine mesylate (BRISDELLE) 7.5 mg cap Take 1 Cap by mouth daily. 30 Cap 1    TOPROL XL 25 mg XL tablet Take 0.5 Tabs by mouth daily. 45 Tab 3    aspirin delayed-release 81 mg tablet Take 81 mg by mouth daily.  FERROUS FUMARATE (IRON PO) Take  by mouth two (2) times a day. Allergies   Allergen Reactions    Ampicillin Hives    Crestor [Rosuvastatin] Myalgia    Lipitor [Atorvastatin] Myalgia    Zetia [Ezetimibe] Myalgia       ROS Respiratory: Negative for shortness of breath. Cardiovascular: Negative for chest pain. Genitourinary: Negative for frequency. Neurological: Negative for dizziness and headaches. Visit Vitals    /80 (BP 1 Location: Right arm, BP Patient Position: Sitting)  Comment: manual    Pulse (!) 54    Temp 97.3 °F (36.3 °C) (Oral)    Resp 20    Ht 5' 6\" (1.676 m)    Wt 198 lb 8 oz (90 kg)    BMI 32.04 kg/m2       Physical Exam Nursing note and vitals reviewed. Constitutional: She is oriented to person, place, and time. She appears well-developed and well-nourished. No distress. HENT:   Mouth/Throat: Oropharynx is clear and moist.   Neck: No JVD present. No thyromegaly present. Cardiovascular: Normal rate, regular rhythm and normal heart sounds. Pulmonary/Chest: Effort normal and breath sounds normal. No respiratory distress. She has no wheezes. She has no rales. Musculoskeletal: She exhibits no edema. Lymphadenopathy:     She has no cervical adenopathy. Neurological: She is alert and oriented to person, place, and time. Coordination normal.   Psychiatric: She has a normal mood and affect.  Her behavior is normal.    Component      Latest Ref Rng & Units 1/27/2017   WBC      4.6 - 13.2 K/uL 5.5   RBC      4.20 - 5.30 M/uL 4.40   HGB 12.0 - 16.0 g/dL 12.0   HCT      35.0 - 45.0 % 37.6   MCV      74.0 - 97.0 FL 85.5   MCH      24.0 - 34.0 PG 27.3   MCHC      31.0 - 37.0 g/dL 31.9   RDW      11.6 - 14.5 % 14.0   PLATELET      210 - 181 K/uL 276   MPV      9.2 - 11.8 FL 12.4 (H)   NEUTROPHILS      40 - 73 % 47   LYMPHOCYTES      21 - 52 % 42   MONOCYTES      3 - 10 % 8   EOSINOPHILS      0 - 5 % 3   BASOPHILS      0 - 2 % 0   ABS. NEUTROPHILS      1.8 - 8.0 K/UL 2.5   ABS. LYMPHOCYTES      0.9 - 3.6 K/UL 2.3   ABS. MONOCYTES      0.05 - 1.2 K/UL 0.5   ABS. EOSINOPHILS      0.0 - 0.4 K/UL 0.2   ABS. BASOPHILS      0.0 - 0.06 K/UL 0.0   DF       AUTOMATED   Sodium      136 - 145 mmol/L 143   Potassium      3.5 - 5.5 mmol/L 3.8   Chloride      100 - 108 mmol/L 104   CO2      21 - 32 mmol/L 30   Anion gap      3.0 - 18 mmol/L 9   Glucose      74 - 99 mg/dL 92   BUN      7.0 - 18 MG/DL 15   Creatinine      0.6 - 1.3 MG/DL 0.75   BUN/Creatinine ratio      12 - 20   20   GFR est AA      >60 ml/min/1.73m2 >60   GFR est non-AA      >60 ml/min/1.73m2 >60   Calcium      8.5 - 10.1 MG/DL 8.9   Bilirubin, total      0.2 - 1.0 MG/DL 0.5   ALT      13 - 56 U/L 23   AST      15 - 37 U/L 14 (L)   Alk.  phosphatase      45 - 117 U/L 48   Protein, total      6.4 - 8.2 g/dL 7.0   Albumin      3.4 - 5.0 g/dL 3.6   Globulin      2.0 - 4.0 g/dL 3.4   A-G Ratio      0.8 - 1.7   1.1   Cholesterol, total      <200 MG/   Triglyceride      <150 MG/DL 83   HDL Cholesterol      40 - 60 MG/DL 78 (H)   LDL, calculated      0 - 100 MG/DL 95.4   VLDL, calculated      MG/DL 16.6   CHOL/HDL Ratio      0 - 5.0   2.4   Hemoglobin A1c, (calculated)      4.2 - 5.6 % 5.9 (H)   Vitamin D 25-Hydroxy      30 - 100 ng/mL 43.9   Magnesium      1.8 - 2.4 mg/dL 2.0   TSH      0.36 - 3.74 uIU/mL 1.31     Results for orders placed or performed in visit on 04/25/17   AMB POC URINALYSIS DIP STICK AUTO W/O MICRO   Result Value Ref Range    Color (UA POC) Yellow     Clarity (UA POC) Clear Glucose (UA POC) Negative Negative    Bilirubin (UA POC) Negative Negative    Ketones (UA POC) Trace Negative    Specific gravity (UA POC) 1.020 1.001 - 1.035    Blood (UA POC) Trace Negative    pH (UA POC) 5.5 4.6 - 8.0    Protein (UA POC) Negative Negative mg/dL    Urobilinogen (UA POC) 0.2 mg/dL 0.2 - 1    Nitrites (UA POC) Negative Negative    Leukocyte esterase (UA POC) Negative Negative       ASSESSMENT and PLAN    ICD-10-CM ICD-9-CM                                                                                11. Acute right flank pain ck urine cx and xr R10.9 789.09 AMB POC URINALYSIS DIP STICK AUTO W/O MICRO     338.19 CULTURE, URINE      XR ABD (AP AND ERECT OR DECUB)     R10.9 789.09    13. Hematuria R31.9 599.70 XR ABD (AP AND ERECT OR DECUB)   14. Chronic pain syndrome Reviewed  database profile, no unexpected activity. Signed narcotic contract on chart. Patient remains compliant with med, refilled recently. G89.4 338.4    15. Arthritis M19.90 716.90    16. Abnormal mammogram R92.8 793.80 MAXIMO MAMMO LT DX INCL CAD   16. History of breast cancer Z85.3 V10.3 MAXIMO MAMMO LT DX INCL CAD   18. Elevated fasting blood sugar O62.44 845.86 METABOLIC PANEL, COMPREHENSIVE      HEMOGLOBIN A1C W/O EAG   19. Hyperlipidemia, unspecified hyperlipidemia type increased some  E78.5 272.4 LIPID PANEL      METABOLIC PANEL, COMPREHENSIVE   20. Medication monitoring encounter Z51.81 V58.83 LIPID PANEL      METABOLIC PANEL, COMPREHENSIVE      HEMOGLOBIN A1C W/O EAG   Follow-up Disposition:  Return in about 6 weeks (around 6/6/2017).

## 2017-04-27 LAB
BACTERIA SPEC CULT: NORMAL
SERVICE CMNT-IMP: NORMAL

## 2017-05-02 ENCOUNTER — TELEPHONE (OUTPATIENT)
Dept: FAMILY MEDICINE CLINIC | Age: 67
End: 2017-05-02

## 2017-05-02 DIAGNOSIS — D22.9 NUMEROUS MOLES: Primary | ICD-10-CM

## 2017-05-02 NOTE — TELEPHONE ENCOUNTER
Pt was recently seen and was suppose to have a referral to [facility] to remove moles.

## 2017-05-03 NOTE — TELEPHONE ENCOUNTER
Order for referral placed per verbal order with read back from Dr. Fransisco Hanley.  Appointment made for patient and left message for her it is with Dr. Martinez Marin, [place], [place], on 5/11/17 at 1:45pm.

## 2017-05-18 ENCOUNTER — TELEPHONE (OUTPATIENT)
Dept: FAMILY MEDICINE CLINIC | Age: 67
End: 2017-05-18

## 2017-05-18 RX ORDER — ERGOCALCIFEROL 1.25 MG/1
50000 CAPSULE ORAL
Qty: 5 CAP | Refills: 6 | Status: SHIPPED | OUTPATIENT
Start: 2017-05-18

## 2017-05-18 NOTE — TELEPHONE ENCOUNTER
Pt contacted office regarding prescription refill on Reyna D. Informed Pt that I'm unsure if she was suppose to continue Reyna D with lab levels being within normal limits.  Pt would like to know if she is supposed to be taking Reyna D.

## 2017-06-02 ENCOUNTER — HOSPITAL ENCOUNTER (OUTPATIENT)
Dept: LAB | Age: 67
Discharge: HOME | End: 2017-06-02
Payer: MEDICARE

## 2017-06-02 DIAGNOSIS — E78.5 HYPERLIPIDEMIA, UNSPECIFIED HYPERLIPIDEMIA TYPE: ICD-10-CM

## 2017-06-02 DIAGNOSIS — R73.01 ELEVATED FASTING BLOOD SUGAR: ICD-10-CM

## 2017-06-02 DIAGNOSIS — Z51.81 MEDICATION MONITORING ENCOUNTER: ICD-10-CM

## 2017-06-02 LAB
ALBUMIN SERPL BCP-MCNC: 3.6 G/DL (ref 3.4–5)
ALBUMIN/GLOB SERPL: 1 {RATIO} (ref 0.8–1.7)
ALP SERPL-CCNC: 44 U/L (ref 45–117)
ALT SERPL-CCNC: 25 U/L (ref 13–56)
ANION GAP BLD CALC-SCNC: 5 MMOL/L (ref 3–18)
AST SERPL W P-5'-P-CCNC: 16 U/L (ref 15–37)
BILIRUB SERPL-MCNC: 0.4 MG/DL (ref 0.2–1)
BUN SERPL-MCNC: 13 MG/DL (ref 7–18)
BUN/CREAT SERPL: 20 (ref 12–20)
CALCIUM SERPL-MCNC: 9.3 MG/DL (ref 8.5–10.1)
CHLORIDE SERPL-SCNC: 106 MMOL/L (ref 100–108)
CHOLEST SERPL-MCNC: 180 MG/DL
CO2 SERPL-SCNC: 29 MMOL/L (ref 21–32)
CREAT SERPL-MCNC: 0.64 MG/DL (ref 0.6–1.3)
GLOBULIN SER CALC-MCNC: 3.5 G/DL (ref 2–4)
GLUCOSE SERPL-MCNC: 90 MG/DL (ref 74–99)
HBA1C MFR BLD: 5.8 % (ref 4.2–5.6)
HDLC SERPL-MCNC: 77 MG/DL (ref 40–60)
HDLC SERPL: 2.3 {RATIO} (ref 0–5)
LDLC SERPL CALC-MCNC: 86.6 MG/DL (ref 0–100)
LIPID PROFILE,FLP: ABNORMAL
POTASSIUM SERPL-SCNC: 4.1 MMOL/L (ref 3.5–5.5)
PROT SERPL-MCNC: 7.1 G/DL (ref 6.4–8.2)
SODIUM SERPL-SCNC: 140 MMOL/L (ref 136–145)
TRIGL SERPL-MCNC: 82 MG/DL (ref ?–150)
VLDLC SERPL CALC-MCNC: 16.4 MG/DL

## 2017-06-02 PROCEDURE — 80061 LIPID PANEL: CPT | Performed by: FAMILY MEDICINE

## 2017-06-02 PROCEDURE — 80053 COMPREHEN METABOLIC PANEL: CPT | Performed by: FAMILY MEDICINE

## 2017-06-02 PROCEDURE — 36415 COLL VENOUS BLD VENIPUNCTURE: CPT | Performed by: FAMILY MEDICINE

## 2017-06-02 PROCEDURE — 83036 HEMOGLOBIN GLYCOSYLATED A1C: CPT | Performed by: FAMILY MEDICINE

## 2017-06-06 DIAGNOSIS — M19.90 ARTHRITIS: ICD-10-CM

## 2017-06-13 RX ORDER — TRAMADOL HYDROCHLORIDE 50 MG/1
50 TABLET ORAL
Qty: 60 TAB | Refills: 0 | Status: SHIPPED | OUTPATIENT
Start: 2017-06-13 | End: 2017-09-21 | Stop reason: SDUPTHER

## 2017-07-18 ENCOUNTER — HOSPITAL ENCOUNTER (OUTPATIENT)
Dept: LAB | Age: 67
Discharge: HOME | End: 2017-07-18

## 2017-07-18 ENCOUNTER — OFFICE VISIT (OUTPATIENT)
Dept: FAMILY MEDICINE CLINIC | Age: 67
End: 2017-07-18

## 2017-07-18 VITALS
RESPIRATION RATE: 20 BRPM | DIASTOLIC BLOOD PRESSURE: 73 MMHG | HEART RATE: 61 BPM | SYSTOLIC BLOOD PRESSURE: 137 MMHG | TEMPERATURE: 97.9 F | HEIGHT: 66 IN | WEIGHT: 198.75 LBS | BODY MASS INDEX: 31.94 KG/M2

## 2017-07-18 DIAGNOSIS — Z79.891 ENCOUNTER FOR MONITORING OPIOID MAINTENANCE THERAPY: ICD-10-CM

## 2017-07-18 DIAGNOSIS — K08.89 TOOTHACHE: ICD-10-CM

## 2017-07-18 DIAGNOSIS — Z51.81 ENCOUNTER FOR MONITORING OPIOID MAINTENANCE THERAPY: ICD-10-CM

## 2017-07-18 DIAGNOSIS — R52 PAIN OF MULTIPLE SITES: ICD-10-CM

## 2017-07-18 DIAGNOSIS — I11.9 BENIGN HYPERTENSIVE HEART DISEASE WITHOUT HEART FAILURE: ICD-10-CM

## 2017-07-18 DIAGNOSIS — G89.4 CHRONIC PAIN SYNDROME: ICD-10-CM

## 2017-07-18 DIAGNOSIS — Z51.81 MEDICATION MONITORING ENCOUNTER: ICD-10-CM

## 2017-07-18 DIAGNOSIS — E78.5 DYSLIPIDEMIA: Primary | ICD-10-CM

## 2017-07-18 DIAGNOSIS — M19.90 ARTHRITIS: ICD-10-CM

## 2017-07-18 PROCEDURE — 99001 SPECIMEN HANDLING PT-LAB: CPT | Performed by: FAMILY MEDICINE

## 2017-07-18 RX ORDER — HYDROCODONE BITARTRATE AND ACETAMINOPHEN 5; 325 MG/1; MG/1
1 TABLET ORAL
Qty: 30 TAB | Refills: 0 | Status: SHIPPED | OUTPATIENT
Start: 2017-07-18 | End: 2017-09-21 | Stop reason: SDUPTHER

## 2017-07-18 NOTE — PROGRESS NOTES
Chief Complaint   Patient presents with    Pain (Chronic)     level 5/10    Arthritis    Blood sugar problem     elevated fasting blood glucose    Cholesterol Problem     high chol    Results     discuss lab results       Health Maintenance Due   Topic Date Due    DTaP/Tdap/Td series (1 - Tdap) 03/15/1972    ZOSTER VACCINE AGE 60>  03/15/2011    GLAUCOMA SCREENING Q2Y  03/15/2016    Pneumococcal 65+ Low/Medium Risk (2 of 2 - PCV13) 03/31/2017       Health Maintenance reviewed     1. Have you been to the ER, urgent care clinic since your last visit? Hospitalized since your last visit? No    2. Have you seen or consulted any other health care providers outside of the [address] [place] [address] since your last visit? Include any pap smears or colon screening.  Yes When: 7/17 Where: dentist Reason for visit: ov

## 2017-07-18 NOTE — LETTER
Name:Tasha Tellez LET:[birth year] MR #:[id number] Provider Xi Dumont MD  
*WFGP-997* BSMG-491 (5/16) Page 1 of 5 Initial [facility] [employer] CONTROLLED SUBSTANCE AGREEMENT I may be prescribed medications that are controlled substances as part  of my treatment plan for management of my medical condition(s). The goal of my treatment plan is to maintain and/or improve my health and wellbeing. Because controlled substances have an increased risk of abuse or harm, continual re-evaluation is needed determine if the goals of my treatment plan are being met for my safety and the safety of others. Shirley Callaway  am entering into this Controlled Substance Agreement with my provider, Trudy Tolentino MD at [place] [address] . I understand that successful treatment requires mutual trust and honesty between me and my provider. I understand that there are state and federal laws and regulations which apply to the medications that my provider may prescribe that must be followed. I understand there are risks and benefits ts of taking the medicines that my provider may prescribe. I understand and agree that following this Agreement is necessary in continuing my provider-patient relationship and success of my treatment plan. As a part of my treatment plan, I agree to the following: COMMUNICATION: 
 
1. I will communicate fully with my provider about my medical condition(s), including the effect on my daily life and how well my medications are helping. I will tell my provider all of the medications that I take for any reason, including medications I receive from another health care provider, and will notify my provider about all issues, problems or concerns, including any side effects, which may be related to my medications. I understand that this information allows my provider to adjust my treatment plan to help manage my medical condition.  I understand that this information will become part of my permanent medical record. 2. I will notify my provider if I have a history of alcohol/drug misuse/addiction or if I have had treatment for alcohol/drug addiction in the past, or if I have a new problem with or concern about alcohol/drug use/addiction, because this increases the likelihood of high risk behaviors and may lead to serious medical conditions. 3. Females Only: I will notify my provider if I am or become pregnant, or if I intend to become pregnant, or if I intend to breastfeed. I understand that communication of these issues with my provider is important, due to possible effects my medication could have on an unborn fetus or breastfeeding child. Name:.Carter Cooney OIW:[birth year] MR #:[id number] Provider Tony Jackson MD  
*SHDR-020* BSMG-491 (5/16) Page 2 of 5 Initial [employer] MISUSE OF MEDICATIONS / DRUGS: 
 
1. I agree to take all controlled substances as prescribed, and will not misuse or abuse any controlled substances prescribed by my provider. For my safety, I will not increase the amount of medicine I take without first talking with and getting permission from my provider. 2. If I have a medical emergency, another health care provider may prescribe me medication. If I seek emergency treatment, I will notify my provider within seventy-two (72) hours. 3. I understand that my provider may discuss my use and/or possible misuse/abuse of controlled substances and alcohol, as appropriate, with any health care provider involved in my care, pharmacist or legal authority. ILLEGAL DRUGS: 
 
1. I will not use illegal drugs of any kind, including but not limited to marijuana, heroin, cocaine, or any prescription drug which is not prescribed to me. DRUG DIVERSION / PRESCRIPTION FRAUD: 
 
1. I will not share, sell, trade, give away, or otherwise misuse my prescriptions or medications. 2. I will not alter any prescriptions provided to me by my provider. SINGLE PROVIDER: 
 
1. I agree that all controlled substances that I take will be prescribed only by my provider (or his/her covering provider) under this Agreement. This agreement does not prevent me from seeking emergency medical treatment or receiving pain management related to a surgery. PROTECTING MEDICATIONS: 
 
1. I am responsible for keeping my prescriptions and medications in a safe and secure place including safeguarding them from loss or theft. I understand that lost, stolen or damaged/destroyed prescriptions or medications will not be replaced. Name:.Carter Santiago MCM:9/83/2002 MR #:[id number] Provider Alexandru Hooks MD  
*FVAT-263* BSMG-491 (5/16) Page 3 of 5 Initial [facility] PRESCRIPTION RENEWALS/REFILLS: 
 
1. I will follow my controlled substance medication schedule as prescribed by my provider. 2. I understand and agree that I will make any requests for renewals or refills of my prescriptions only at the time of an office visit or during my providers regular office hours subject to the prescription refill requirements of the individual practice. 3. I understand that my provider may not call in prescriptions for controlled substances to my pharmacy. 4. I understand that my provider may adjust or discontinue these medications as deemed appropriate for my medical treatment plan. This Agreement does not guarantee the prescription of controlled medications. 5. I agree that if my medications are adjusted or discontinued, I will properly dispose of any remaining medications. I understand that I will be required to dispose of any remaining controlled medications prior to being provided with any prescriptions for other controlled medications.  
 
 
1. I authorize my provider and my pharmacy to cooperate fully with any local, state, or federal law enforcement agency in the investigation of any possible misuse, sale, or other diversion of my controlled substance prescriptions or medications. RISKS: 
 
 
1. I understand that if I do not adhere to this Agreement in any way, my provider may change my prescriptions, stop prescribing controlled substances or end our provider-patient relationship. 2. If my provider decides to stop prescribing medication, or decides to end our provider-patient relationship,my provider may require that I taper my medications slowly. If necessary, my provider may also provide a prescription for other medications to treat my withdrawal symptoms. UNDERSTANDING THIS AGREEMENT: 
 
I understand that my provider may adjust or stop my prescriptions for controlled substances based on my medical condition and my treatment plan. I understand that this Agreement does not guarantee that I will be prescribed medications or controlled substances. I understand that controlled substances may be just one part 
of my treatment plan. My initial on each page and my signature below shows that I have read each page of this Agreement, I have had an opportunity to ask questions, and all of my questions have been answered to my satisfaction by my provider. By signing below, I agree to comply with this Agreement, and I understand that if I do not follow the Agreements listed above, my provider may stop 
 
 
 
_________________________________________  Date/Time 7/18/2017 1:19 PM   
             (Patient Signature)

## 2017-07-18 NOTE — MR AVS SNAPSHOT
Visit Information Date & Time Provider Department Dept. Phone Encounter #  
 7/18/2017  1:00 PM Patricia Galeano, [address] [phone] [id number] Follow-up Instructions Return in about 2 months (around 9/18/2017). Upcoming Health Maintenance Date Due DTaP/Tdap/Td series (1 - Tdap) 3/15/1972 ZOSTER VACCINE AGE 60> 3/15/2011 GLAUCOMA SCREENING Q2Y 3/15/2016 Pneumococcal 65+ Low/Medium Risk (2 of 2 - PCV13) 3/31/2017 INFLUENZA AGE 9 TO ADULT 8/1/2017 COLONOSCOPY 1/28/2018 MEDICARE YEARLY EXAM 4/26/2018 BREAST CANCER SCRN MAMMOGRAM 10/28/2018 Allergies as of 7/18/2017  Review Complete On: 7/18/2017 By: Patricia Galeano MD  
  
 Severity Noted Reaction Type Reactions Ampicillin  07/28/2010    Hives Crestor [Rosuvastatin]  07/28/2010    Myalgia Lipitor [Atorvastatin]  07/28/2010    Myalgia Zetia [Ezetimibe]  07/28/2010    Myalgia Current Immunizations  Reviewed on 7/12/2016 Name Date Influenza High Dose Vaccine PF 9/15/2016 Influenza Vaccine 10/8/2015, 12/18/2012 Influenza Vaccine PF 12/10/2014, 10/22/2013 Influenza Vaccine Whole 11/3/2010, 12/1/2008 Pneumococcal Polysaccharide (PPSV-23) 3/31/2016 Not reviewed this visit You Were Diagnosed With   
  
 Codes Comments Dyslipidemia    -  Primary ICD-10-CM: E78.5 ICD-9-CM: 272.4 Benign hypertensive heart disease without heart failure     ICD-10-CM: I11.9 ICD-9-CM: 402.10 Arthritis     ICD-10-CM: M19.90 ICD-9-CM: 716.90 Pain of multiple sites     ICD-10-CM: R52 ICD-9-CM: 780.96 Chronic pain syndrome     ICD-10-CM: G89.4 ICD-9-CM: 338.4 Encounter for monitoring opioid maintenance therapy     ICD-10-CM: Z51.81, [id number] ICD-9-CM: V58.83, V58.69 Toothache     ICD-10-CM: K40.69 ICD-9-CM: 525.9 Medication monitoring encounter     ICD-10-CM: Z51.81 
ICD-9-CM: V58.83 Vitals BP Pulse Temp Resp Height(growth percentile) Weight(growth percentile)  
 137/73 (BP 1 Location: Right arm, BP Patient Position: Sitting) 61 97.9 °F (36.6 °C) (Oral) 20 5' 6\" (1.676 m) 198 lb 12 oz (90.2 kg) BMI OB Status Smoking Status 32.08 kg/m2 Postmenopausal Former Smoker BMI and BSA Data Body Mass Index Body Surface Area 32.08 kg/m 2 2.05 m 2 Preferred Pharmacy Pharmacy Name Phone Finesse Valenzuela [facility] [phone] Your Updated Medication List  
  
   
This list is accurate as of: 7/18/17  1:30 PM.  Always use your most recent med list.  
  
  
  
  
 aspirin delayed-release 81 mg tablet Take 81 mg by mouth daily. ergocalciferol 50,000 unit capsule Commonly known as:  ERGOCALCIFEROL Take 1 Cap by mouth every seven (7) days. ezetimibe 10 mg tablet Commonly known as:  Belvie Gobble Take 1 Tab by mouth daily. HYDROcodone-acetaminophen 5-325 mg per tablet Commonly known as:  Brannon Collar Take 1 Tab by mouth every four (4) hours as needed for Pain. IRON PO Take  by mouth two (2) times a day. lansoprazole 30 mg capsule Commonly known as:  PREVACID Take 1 Cap by mouth Daily (before breakfast). lisinopril 20 mg tablet Commonly known as:  Manuel Little Take  by mouth daily. nitroglycerin 0.4 mg SL tablet Commonly known as:  NITROSTAT  
1 Tab by SubLINGual route every five (5) minutes as needed. PARoxetine mesylate 7.5 mg Cap Commonly known as:  Amy Spanner Take 1 Cap by mouth daily. TOPROL XL 25 mg XL tablet Generic drug:  metoprolol succinate Take 0.5 Tabs by mouth daily. traMADol 50 mg tablet Commonly known as:  ULTRAM  
Take 1 Tab by mouth two (2) times daily as needed for Pain. Prescriptions Printed Refills  HYDROcodone-acetaminophen (NORCO) 5-325 mg per tablet 0  
 Sig: Take 1 Tab by mouth every four (4) hours as needed for Pain. Class: Print Route: Oral  
  
Follow-up Instructions Return in about 2 months (around 9/18/2017). To-Do List   
 07/18/2017 Lab:  [facility] ([facility])   
  
  
Patient Instructions Please contact our office if you have any questions about your visit today. Introducing \[facility] [place] [facility]\"" & HEALTH SERVICES! Chidi Maria introduces [employer] patient portal. Now you can access parts of your medical record, email your doctor's office, and request medication refills online. 1. In your internet browser, go to [email or web address] 2. Click on the First Time User? Click Here link in the Sign In box. You will see the New Member Sign Up page. 3. Enter your [employer] Access Code exactly as it appears below. You will not need to use this code after youve completed the sign-up process. If you do not sign up before the expiration date, you must request a new code. · [employer] Access Code: [email or web address] Expires: 7/24/2017  3:15 PM 
 
4. Enter the last four digits of your Social Security Number (xxxx) and Date of Birth (mm/dd/yyyy) as indicated and click Submit. You will be taken to the next sign-up page. 5. Create a [employer]oid ID. This will be your [employer] login ID and cannot be changed, so think of one that is secure and easy to remember. 6. Create a [employer] password. You can change your password at any time. 7. Enter your Password Reset Question and Answer. This can be used at a later time if you forget your password. 8. Enter your e-mail address. You will receive e-mail notification when new information is available in [address] 9. Click Sign Up. You can now view and download portions of your medical record. 10. Click the Download Summary menu link to download a portable copy of your medical information.  
 
If you have questions, please visit the Frequently Asked Questions section of the Click Bus. Remember, [facility] is NOT to be used for urgent needs. For medical emergencies, dial 911. Now available from your iPhone and Android! Please provide this summary of care documentation to your next provider. Your primary care clinician is listed as SALINAS ALMARAZ. If you have any questions after today's visit, please call [phone].

## 2017-07-18 NOTE — PROGRESS NOTES
HISTORY OF PRESENT ILLNESS  Carter Pal is a 78 y.o. female. Chief Complaint   Patient presents with    Pain (Chronic)     level 5/10    Arthritis    Blood sugar problem     elevated fasting blood glucose    Cholesterol Problem chronic problem, stable      high chol    Results     discuss lab results     Filomena Perez RTC today to discuss her osteoarthritis that is affecting her back and bilateral knee. Significant changes since last visit: none. She is  able to do her normal daily activities. She reports the following adverse side effects: none. Least pain over the last week has been 2/10. Worst pain over the last week has been 7/10. Aberrant behaviors: None. Urine Drug Screen: due - order placed. Pain agreement on file: Completed today.  reviewed: yes. Pill count is consistent with her prescription: yes  Concomitant use of a benzodiazepine: no    Opioid Risk Tool Reviewedplan; sports med: Provided reassurance   yes                  HPI  Past Medical History:   Diagnosis Date    Abnormal nuclear cardiac imaging test 03/23/2009    Moderate inferior infarction w/o ischemia. Mild anterior artifact. EF 55%. Mild inferior hypk. Neg EKG on max EST. Ex time 7:30.    Arthritis     Breast cancer [facility])     resection '06;  chemotherapy;  XRT    Coronary artery disease     RCA - 2.75 x 15mm ACS ZETA x2 5/03    Dyslipidemia     GERD     History of echocardiogram 11/24/2008    EF 61% (3D). No clear-cut WMA. Gr 1 DDfx. Mild LAE. Mild MR. Mild TR. PASP 40.      Hypertension     Lung cancer [facility])     resection '09;  no chemotherapy,  no XRT    S/P cardiac cath 05/27/2003    [facility]:  Prox RCA mild. Prior stent patent. LM  patent. pLAD 20%. CX patent. Current Outpatient Prescriptions   Medication Sig Dispense Refill    traMADol (ULTRAM) 50 mg tablet Take 1 Tab by mouth two (2) times daily as needed for Pain.  60 Tab 0    ergocalciferol (ERGOCALCIFEROL) 50,000 unit capsule Take 1 Cap by mouth every seven (7) days. 5 Cap 6    lisinopril (PRINIVIL, ZESTRIL) 20 mg tablet Take  by mouth daily.  HYDROcodone-acetaminophen (NORCO) 5-325 mg per tablet Take 1 Tab by mouth every four (4) hours as needed for Pain. 30 Tab 0    lansoprazole (PREVACID) 30 mg capsule Take 1 Cap by mouth Daily (before breakfast). 90 Cap 3    ezetimibe (ZETIA) 10 mg tablet Take 1 Tab by mouth daily. 90 Tab 3    nitroglycerin (NITROSTAT) 0.4 mg SL tablet 1 Tab by SubLINGual route every five (5) minutes as needed. 30 Tab 1    PARoxetine mesylate (BRISDELLE) 7.5 mg cap Take 1 Cap by mouth daily. 30 Cap 1    TOPROL XL 25 mg XL tablet Take 0.5 Tabs by mouth daily. 45 Tab 3    aspirin delayed-release 81 mg tablet Take 81 mg by mouth daily.  FERROUS FUMARATE (IRON PO) Take  by mouth two (2) times a day. Allergies   Allergen Reactions    Ampicillin Hives    Crestor [Rosuvastatin] Myalgia    Lipitor [Atorvastatin] Myalgia    Zetia [Ezetimibe] Myalgia       ROS  Visit Vitals    /73 (BP 1 Location: Right arm, BP Patient Position: Sitting)    Pulse 61    Temp 97.9 °F (36.6 °C) (Oral)    Resp 20    Ht 5' 6\" (1.676 m)    Wt 198 lb 12 oz (90.2 kg)    BMI 32.08 kg/m2       Physical Exam  .Nursing note and vitals reviewed. Constitutional: She is oriented to person, place, and time. She appears well-developed and well-nourished. No distress. HENT:   Mouth/Throat: Oropharynx is clear and moist.   Neck: No JVD present. No thyromegaly present. Cardiovascular: Normal rate, regular rhythm and normal heart sounds. Pulmonary/Chest: Effort normal and breath sounds normal. No respiratory distress. She has no wheezes. She has no rales. Musculoskeletal: She exhibits no edema. Lymphadenopathy:     She has no cervical adenopathy. Neurological: She is alert and oriented to person, place, and time.  Coordination normal.   Psychiatric: She has a normal mood and affect. Her behavior is normal.    Results for orders placed or performed during the hospital encounter of 06/02/17   LIPID PANEL   Result Value Ref Range    LIPID PROFILE          Cholesterol, total 180 <200 MG/DL    Triglyceride 82 <150 MG/DL    HDL Cholesterol 77 (H) 40 - 60 MG/DL    LDL, calculated 86.6 0 - 100 MG/DL    VLDL, calculated 16.4 MG/DL    CHOL/HDL Ratio 2.3 0 - 5.0     METABOLIC PANEL, COMPREHENSIVE   Result Value Ref Range    Sodium 140 136 - 145 mmol/L    Potassium 4.1 3.5 - 5.5 mmol/L    Chloride 106 100 - 108 mmol/L    CO2 29 21 - 32 mmol/L    Anion gap 5 3.0 - 18 mmol/L    Glucose 90 74 - 99 mg/dL    BUN 13 7.0 - 18 MG/DL    Creatinine 0.64 0.6 - 1.3 MG/DL    BUN/Creatinine ratio 20 12 - 20      GFR est AA >60 >60 ml/min/1.73m2    GFR est non-AA >60 >60 ml/min/1.73m2    Calcium 9.3 8.5 - 10.1 MG/DL    Bilirubin, total 0.4 0.2 - 1.0 MG/DL    ALT (SGPT) 25 13 - 56 U/L    AST (SGOT) 16 15 - 37 U/L    Alk. phosphatase 44 (L) 45 - 117 U/L    Protein, total 7.1 6.4 - 8.2 g/dL    Albumin 3.6 3.4 - 5.0 g/dL    Globulin 3.5 2.0 - 4.0 g/dL    A-G Ratio 1.0 0.8 - 1.7     HEMOGLOBIN A1C W/O EAG   Result Value Ref Range    Hemoglobin A1c 5.8 (H) 4.2 - 5.6 %       ASSESSMENT and PLAN      ICD-10-CM ICD-9-CM    1. Dyslipidemia E78.5 272.4    2. Hypertension I11.9 402.10    3. Arthritis M19.90 716.90 HYDROcodone-acetaminophen (NORCO) 5-325 mg per tablet      TOXASSURE SELECT 13 (MW)      TOXASSURE SELECT 13 (MW)   4. Pain of multiple sites R52 780.96 HYDROcodone-acetaminophen (NORCO) 5-325 mg per tablet      TOXASSURE SELECT 13 (MW)      Gómez Stallion 13 (MW)   5. Chronic pain syndrome G89.4 338.4 TOXASSURE SELECT 13 (MW)      TOXASSURE SELECT 13 (MW)   6. Encounter for monitoring opioid maintenance therapy Z51.81 V58.83 TOXASSURE SELECT 13 (MW)    Z79.891 V58.69 TOXASSURE SELECT 13 (MW)   7. Toothache K08.89 525.9    8.  Medication monitoring encounter Z51.81 V58.83    Follow-up Disposition:  Return in about 2 months (around 9/18/2017).

## 2017-07-24 LAB — HEMOCCULT STL QL IA: NEGATIVE

## 2017-07-26 LAB — DRUGS UR: NORMAL

## 2017-09-21 ENCOUNTER — OFFICE VISIT (OUTPATIENT)
Dept: FAMILY MEDICINE CLINIC | Age: 67
End: 2017-09-21

## 2017-09-21 VITALS
HEART RATE: 60 BPM | SYSTOLIC BLOOD PRESSURE: 143 MMHG | RESPIRATION RATE: 20 BRPM | DIASTOLIC BLOOD PRESSURE: 79 MMHG | WEIGHT: 200.5 LBS | BODY MASS INDEX: 32.22 KG/M2 | HEIGHT: 66 IN | TEMPERATURE: 97.9 F

## 2017-09-21 DIAGNOSIS — R52 PAIN OF MULTIPLE SITES: ICD-10-CM

## 2017-09-21 DIAGNOSIS — K08.89 PAIN, DENTAL: ICD-10-CM

## 2017-09-21 DIAGNOSIS — I11.9 BENIGN HYPERTENSIVE HEART DISEASE WITHOUT HEART FAILURE: ICD-10-CM

## 2017-09-21 DIAGNOSIS — M19.90 ARTHRITIS: Primary | ICD-10-CM

## 2017-09-21 RX ORDER — TRAMADOL HYDROCHLORIDE 50 MG/1
50 TABLET ORAL
Qty: 60 TAB | Refills: 0 | Status: SHIPPED | OUTPATIENT
Start: 2017-09-21 | End: 2017-11-21 | Stop reason: SINTOL

## 2017-09-21 RX ORDER — HYDROCODONE BITARTRATE AND ACETAMINOPHEN 5; 325 MG/1; MG/1
1 TABLET ORAL
Qty: 30 TAB | Refills: 0 | Status: SHIPPED | OUTPATIENT
Start: 2017-09-21 | End: 2017-11-21 | Stop reason: SDUPTHER

## 2017-09-21 NOTE — PROGRESS NOTES
Chief Complaint   Patient presents with    Cholesterol Problem     dyslipidemia    Hypertension    Arthritis    Pain (Chronic)       Health Maintenance Due   Topic Date Due    DTaP/Tdap/Td series (1 - Tdap) 03/15/1972    ZOSTER VACCINE AGE 60>  01/15/2011    GLAUCOMA SCREENING Q2Y  03/15/2016    Pneumococcal 65+ Low/Medium Risk (2 of 2 - PCV13) 03/31/2017    INFLUENZA AGE 9 TO ADULT  08/01/2017    COLONOSCOPY  01/28/2018       Health Maintenance reviewed    1. Have you been to the ER, urgent care clinic since your last visit? Hospitalized since your last visit? No    2. Have you seen or consulted any other health care providers outside of the [address] [place] [address] since your last visit? Include any pap smears or colon screening.  Yes When: 9/17 Where: dentist Reason for visit: ov

## 2017-09-21 NOTE — PROGRESS NOTES
HISTORY OF PRESENT ILLNESS  Carter Arriaza is a 78 y.o. female.,  Chief Complaint   Patient presents with    Cholesterol Problem chronic problem, stable      dyslipidemia    Hypertension chronic problem, stable     Arthritis    Pain (Chronic)   doesn't feel last rx of norco was as effective, pills were different. Pain level 4, diffuse arthritis back,knees,  tramadol for milder pain and norco for more severe pain    HPI  Past Medical History:   Diagnosis Date    Abnormal nuclear cardiac imaging test 03/23/2009    Moderate inferior infarction w/o ischemia. Mild anterior artifact. EF 55%. Mild inferior hypk. Neg EKG on max EST. Ex time 7:30.    Arthritis     Breast cancer [facility])     resection '06;  chemotherapy;  XRT    Coronary artery disease     RCA - 2.75 x 15mm ACS ZETA x2 5/03    Dyslipidemia     GERD     History of echocardiogram 11/24/2008    EF 61% (3D). No clear-cut WMA. Gr 1 DDfx. Mild LAE. Mild MR. Mild TR. PASP 40.      Hypertension     Lung cancer [facility])     resection '09;  no chemotherapy,  no XRT    S/P cardiac cath 05/27/2003    Obici:  Prox RCA mild. Prior stent patent. LM  patent. pLAD 20%. CX patent. Current Outpatient Prescriptions   Medication Sig Dispense Refill    HYDROcodone-acetaminophen (NORCO) 5-325 mg per tablet Take 1 Tab by mouth every four (4) hours as needed for Pain. 30 Tab 0    traMADol (ULTRAM) 50 mg tablet Take 1 Tab by mouth two (2) times daily as needed for Pain. 60 Tab 0    ergocalciferol (ERGOCALCIFEROL) 50,000 unit capsule Take 1 Cap by mouth every seven (7) days. 5 Cap 6    lisinopril (PRINIVIL, ZESTRIL) 20 mg tablet Take  by mouth daily.  lansoprazole (PREVACID) 30 mg capsule Take 1 Cap by mouth Daily (before breakfast). 90 Cap 3    ezetimibe (ZETIA) 10 mg tablet Take 1 Tab by mouth daily. 90 Tab 3    nitroglycerin (NITROSTAT) 0.4 mg SL tablet 1 Tab by SubLINGual route every five (5) minutes as needed.  30 Tab 1    PARoxetine mesylate (BRISDELLE) 7.5 mg cap Take 1 Cap by mouth daily. 30 Cap 1    TOPROL XL 25 mg XL tablet Take 0.5 Tabs by mouth daily. 45 Tab 3    aspirin delayed-release 81 mg tablet Take 81 mg by mouth daily.  FERROUS FUMARATE (IRON PO) Take  by mouth two (2) times a day. Allergies   Allergen Reactions    Ampicillin Hives    Crestor [Rosuvastatin] Myalgia    Lipitor [Atorvastatin] Myalgia    Zetia [Ezetimibe] Myalgia       ROS Respiratory: Negative for shortness of breath. Cardiovascular: Negative for chest pain. Genitourinary: Negative for frequency. Neurological: Negative for dizziness and headaches. Visit Vitals    /79 (BP 1 Location: Right arm, BP Patient Position: Sitting)    Pulse 60    Temp 97.9 °F (36.6 °C) (Oral)    Resp 20    Ht 5' 6\" (1.676 m)    Wt 200 lb 8 oz (90.9 kg)    BMI 32.36 kg/m2       Physical Exam Nursing note and vitals reviewed. Constitutional: She is oriented to person, place, and time. She appears well-developed and well-nourished. No distress. HENT:   Mouth/Throat: Oropharynx is clear and moist.   Neck: No JVD present. No thyromegaly present. Cardiovascular: Normal rate, regular rhythm and normal heart sounds. Pulmonary/Chest: Effort normal and breath sounds normal. No respiratory distress. She has no wheezes. She has no rales. Musculoskeletal: She exhibits no edema. Lymphadenopathy:     She has no cervical adenopathy. Neurological: She is alert and oriented to person, place, and time. Coordination normal.   Psychiatric: She has a normal mood and affect. Her behavior is normal.    Results for orders placed or performed in visit on 07/18/17   Yoel Dennis SELECT 13 (MW)   Result Value Ref Range    REPORT SUMMARY FINAL    OCCULT BLOOD, IMMUNOASSAY   Result Value Ref Range    Occult blood fecal, by IA Negative Negative      ASSESSMENT and PLAN    ICD-10-CM ICD-9-CM    1. Arthritis Reviewed  database profile, no unexpected activity. Signed narcotic contract on chart. Patient remains compliant with med, refilled today. M19.90 716.90 HYDROcodone-acetaminophen (NORCO) 5-325 mg per tablet      traMADol (ULTRAM) 50 mg tablet   2. Pain of multiple sites R52 780.96 HYDROcodone-acetaminophen (NORCO) 5-325 mg per tablet   3. Hypertension stable continue current medications  I11.9 402.10    4. Pain, dental K08.89 525.9    5. .hyperlipidemia stable continue current medications     Follow-up Disposition:  Return in about 2 months (around 11/21/2017).

## 2017-09-21 NOTE — MR AVS SNAPSHOT
Visit Information Date & Time Provider Department Dept. Phone Encounter #  
 9/21/2017  1:45 PM Emily Ramires MD [address] [phone] [id number] Follow-up Instructions Return in about 2 months (around 11/21/2017). Upcoming Health Maintenance Date Due DTaP/Tdap/Td series (1 - Tdap) 3/15/1972 ZOSTER VACCINE AGE 60> 1/15/2011 GLAUCOMA SCREENING Q2Y 3/15/2016 Pneumococcal 65+ Low/Medium Risk (2 of 2 - PCV13) 3/31/2017 COLONOSCOPY 1/28/2018 MEDICARE YEARLY EXAM 4/26/2018 BREAST CANCER SCRN MAMMOGRAM 5/30/2019 Allergies as of 9/21/2017  Review Complete On: 9/21/2017 By: Emily Ramires MD  
  
 Severity Noted Reaction Type Reactions Ampicillin  07/28/2010    Hives Crestor [Rosuvastatin]  07/28/2010    Myalgia Lipitor [Atorvastatin]  07/28/2010    Myalgia Zetia [Ezetimibe]  07/28/2010    Myalgia Current Immunizations  Reviewed on 7/12/2016 Name Date Influenza High Dose Vaccine PF 9/15/2016 Influenza Vaccine 10/8/2015, 12/18/2012 Influenza Vaccine PF 12/10/2014, 10/22/2013 Influenza Vaccine Whole 11/3/2010, 12/1/2008 Pneumococcal Polysaccharide (PPSV-23) 3/31/2016 Not reviewed this visit You Were Diagnosed With   
  
 Codes Comments Arthritis    -  Primary ICD-10-CM: M19.90 ICD-9-CM: 716.90 Pain of multiple sites     ICD-10-CM: R52 ICD-9-CM: 780.96 Benign hypertensive heart disease without heart failure     ICD-10-CM: I11.9 ICD-9-CM: 402.10 Pain, dental     ICD-10-CM: T03.12 ICD-9-CM: 525.9 Vitals BP Pulse Temp Resp Height(growth percentile) Weight(growth percentile) 143/79 (BP 1 Location: Right arm, BP Patient Position: Sitting) 60 97.9 °F (36.6 °C) (Oral) 20 5' 6\" (1.676 m) 200 lb 8 oz (90.9 kg) BMI OB Status Smoking Status 32.36 kg/m2 Postmenopausal Former Smoker BMI and BSA Data  Body Mass Index Body Surface Area  
 32.36 kg/m 2 2.06 m 2  
 Preferred Pharmacy Pharmacy Name Phone [facility], [place] [facility] [phone] Your Updated Medication List  
  
   
This list is accurate as of: 9/21/17  2:44 PM.  Always use your most recent med list.  
  
  
  
  
 aspirin delayed-release 81 mg tablet Take 81 mg by mouth daily. ergocalciferol 50,000 unit capsule Commonly known as:  ERGOCALCIFEROL Take 1 Cap by mouth every seven (7) days. ezetimibe 10 mg tablet Commonly known as:  Marybeth Peaks Take 1 Tab by mouth daily. HYDROcodone-acetaminophen 5-325 mg per tablet Commonly known as:  Janas Nubieber Take 1 Tab by mouth every four (4) hours as needed for Pain. IRON PO Take  by mouth two (2) times a day. lansoprazole 30 mg capsule Commonly known as:  PREVACID Take 1 Cap by mouth Daily (before breakfast). lisinopril 20 mg tablet Commonly known as:  Ran Kia Take  by mouth daily. nitroglycerin 0.4 mg SL tablet Commonly known as:  NITROSTAT  
1 Tab by SubLINGual route every five (5) minutes as needed. PARoxetine mesylate 7.5 mg Cap Commonly known as:  Annell Terra Take 1 Cap by mouth daily. TOPROL XL 25 mg XL tablet Generic drug:  metoprolol succinate Take 0.5 Tabs by mouth daily. traMADol 50 mg tablet Commonly known as:  ULTRAM  
Take 1 Tab by mouth two (2) times daily as needed for Pain. Prescriptions Printed Refills HYDROcodone-acetaminophen (NORCO) 5-325 mg per tablet 0 Sig: Take 1 Tab by mouth every four (4) hours as needed for Pain. Class: Print Route: Oral  
 traMADol (ULTRAM) 50 mg tablet 0 Sig: Take 1 Tab by mouth two (2) times daily as needed for Pain. Class: Print Route: Oral  
  
Follow-up Instructions Return in about 2 months (around 11/21/2017). Patient Instructions Please contact our office if you have any questions about your visit today. Introducing [facility]! [facility] introduces [employer] patient portal. Now you can access parts of your medical record, email your doctor's office, and request medication refills online. 1. In your internet browser, go to [email or web address] 2. Click on the First Time User? Click Here link in the Sign In box. You will see the New Member Sign Up page. 3. Enter your Doyenz Access Code exactly as it appears below. You will not need to use this code after youve completed the sign-up process. If you do not sign up before the expiration date, you must request a new code. · D[employer]enz Access Code: [email or web address] Expires: 12/20/2017  2:44 PM 
 
4. Enter the last four digits of your Social Security Number (xxxx) and Date of Birth (mm/dd/yyyy) as indicated and click Submit. You will be taken to the next sign-up page. 5. Create a Doyenz ID. This will be your Doyenz login ID and cannot be changed, so think of one that is secure and easy to remember. 6. Create a Doyenz password. You can change your password at any time. 7. Enter your Password Reset Question and Answer. This can be used at a later time if you forget your password. 8. Enter your e-mail address. You will receive e-mail notification when new information is available in [address] 9. Click Sign Up. You can now view and download portions of your medical record. 10. Click the Download Summary menu link to download a portable copy of your medical information. If you have questions, please visit the Frequently Asked Questions section of the Doy[employer] website. Remember, [employer] is NOT to be used for urgent needs. For medical emergencies, dial 911. Now available from your iPhone and Android! Please provide this summary of care documentation to your next provider. Your primary care clinician is listed as SALINAS ALMARAZ. If you have any questions after today's visit, please call [phone].

## 2017-11-21 ENCOUNTER — OFFICE VISIT (OUTPATIENT)
Dept: FAMILY MEDICINE CLINIC | Age: 67
End: 2017-11-21

## 2017-11-21 VITALS
SYSTOLIC BLOOD PRESSURE: 150 MMHG | DIASTOLIC BLOOD PRESSURE: 80 MMHG | HEIGHT: 66 IN | HEART RATE: 57 BPM | TEMPERATURE: 98.6 F | OXYGEN SATURATION: 98 % | WEIGHT: 200 LBS | BODY MASS INDEX: 32.14 KG/M2

## 2017-11-21 DIAGNOSIS — R52 PAIN OF MULTIPLE SITES: ICD-10-CM

## 2017-11-21 DIAGNOSIS — K21.9 GASTROESOPHAGEAL REFLUX DISEASE WITHOUT ESOPHAGITIS: ICD-10-CM

## 2017-11-21 DIAGNOSIS — Z51.81 MEDICATION MONITORING ENCOUNTER: ICD-10-CM

## 2017-11-21 DIAGNOSIS — E78.5 DYSLIPIDEMIA: ICD-10-CM

## 2017-11-21 DIAGNOSIS — M19.90 ARTHRITIS: Primary | ICD-10-CM

## 2017-11-21 DIAGNOSIS — R73.01 ELEVATED FASTING BLOOD SUGAR: ICD-10-CM

## 2017-11-21 DIAGNOSIS — I11.9 BENIGN HYPERTENSIVE HEART DISEASE WITHOUT HEART FAILURE: ICD-10-CM

## 2017-11-21 DIAGNOSIS — E55.9 VITAMIN D DEFICIENCY: ICD-10-CM

## 2017-11-21 RX ORDER — HYDROCODONE BITARTRATE AND ACETAMINOPHEN 5; 325 MG/1; MG/1
1 TABLET ORAL
Qty: 30 TAB | Refills: 0 | Status: SHIPPED | OUTPATIENT
Start: 2017-11-21

## 2017-11-21 RX ORDER — LANSOPRAZOLE 30 MG/1
30 CAPSULE, DELAYED RELEASE ORAL
Qty: 90 CAP | Refills: 3 | Status: SHIPPED | OUTPATIENT
Start: 2017-11-21

## 2017-11-21 RX ORDER — LISINOPRIL 20 MG/1
40 TABLET ORAL DAILY
Qty: 180 TAB | Refills: 3 | Status: SHIPPED | OUTPATIENT
Start: 2017-11-21

## 2017-11-21 NOTE — MR AVS SNAPSHOT
Visit Information Date & Time Provider Department Dept. Phone Encounter #  
 11/21/2017  2:30 PM Hailey HidalgoAndrea [phone] Follow-up Instructions Return in about 6 weeks (around 1/2/2018). Upcoming Health Maintenance Date Due DTaP/Tdap/Td series (1 - Tdap) 3/15/1972 ZOSTER VACCINE AGE 60> 1/15/2011 GLAUCOMA SCREENING Q2Y 3/15/2016 Pneumococcal 65+ Low/Medium Risk (2 of 2 - PCV13) 3/31/2017 COLONOSCOPY 1/28/2018 MEDICARE YEARLY EXAM 4/26/2018 BREAST CANCER SCRN MAMMOGRAM 5/30/2019 Allergies as of 11/21/2017  Review Complete On: 11/21/2017 By: Hailey Hidalgo MD  
  
 Severity Noted Reaction Type Reactions Ampicillin  07/28/2010    Hives Crestor [Rosuvastatin]  07/28/2010    Myalgia Lipitor [Atorvastatin]  07/28/2010    Myalgia Zetia [Ezetimibe]  07/28/2010    Myalgia Current Immunizations  Reviewed on 7/12/2016 Name Date Influenza High Dose Vaccine PF 9/15/2016 Influenza Vaccine 10/8/2015, 12/18/2012 Influenza Vaccine PF 12/10/2014, 10/22/2013 Influenza Vaccine Whole 11/3/2010, 12/1/2008 Pneumococcal Polysaccharide (PPSV-23) 3/31/2016 Not reviewed this visit You Were Diagnosed With   
  
 Codes Comments Arthritis    -  Primary ICD-10-CM: M19.90 ICD-9-CM: 716.90 Pain of multiple sites     ICD-10-CM: R52 ICD-9-CM: 780.96 Benign hypertensive heart disease without heart failure     ICD-10-CM: I11.9 ICD-9-CM: 402.10 Vitamin D deficiency     ICD-10-CM: E55.9 ICD-9-CM: 268.9 Dyslipidemia     ICD-10-CM: E78.5 ICD-9-CM: 272.4 Elevated fasting blood sugar     ICD-10-CM: R73.01 
ICD-9-CM: 790.21 Gastroesophageal reflux disease without esophagitis     ICD-10-CM: K21.9 ICD-9-CM: 530.81 Medication monitoring encounter     ICD-10-CM: Z51.81 
ICD-9-CM: V58.83 Vitals BP Pulse Temp Height(growth percentile) Weight(growth percentile) SpO2 150/80 (!) 57 98.6 °F (37 °C) (Oral) 5' 6\" (1.676 m) 200 lb (90.7 kg) 98% BMI OB Status Smoking Status 32.28 kg/m2 Postmenopausal Former Smoker Vitals History BMI and BSA Data Body Mass Index Body Surface Area  
 32.28 kg/m 2 2.06 m 2 Preferred Pharmacy Pharmacy Name Phone Mateo Morejon, [place] [facility] [phone] Your Updated Medication List  
  
   
This list is accurate as of: 11/21/17  3:36 PM.  Always use your most recent med list.  
  
  
  
  
 aspirin delayed-release 81 mg tablet Take 81 mg by mouth daily. ergocalciferol 50,000 unit capsule Commonly known as:  ERGOCALCIFEROL Take 1 Cap by mouth every seven (7) days. ezetimibe 10 mg tablet Commonly known as:  Ada Ady Take 1 Tab by mouth daily. HYDROcodone-acetaminophen 5-325 mg per tablet Commonly known as:  Jelly Kind Take 1 Tab by mouth every four (4) hours as needed for Pain. IRON PO Take  by mouth two (2) times a day. lansoprazole 30 mg capsule Commonly known as:  PREVACID Take 1 Cap by mouth Daily (before breakfast). lisinopril 20 mg tablet Commonly known as:  Celestina Lights Take 2 Tabs by mouth daily. nitroglycerin 0.4 mg SL tablet Commonly known as:  NITROSTAT  
1 Tab by SubLINGual route every five (5) minutes as needed. PARoxetine mesylate 7.5 mg Cap Commonly known as:  Arland Laws Take 1 Cap by mouth daily. TOPROL XL 25 mg XL tablet Generic drug:  metoprolol succinate Take 0.5 Tabs by mouth daily. Prescriptions Printed Refills HYDROcodone-acetaminophen (NORCO) 5-325 mg per tablet 0 Sig: Take 1 Tab by mouth every four (4) hours as needed for Pain. Class: Print Route: Oral  
 lisinopril (PRINIVIL, ZESTRIL) 20 mg tablet 3 Sig: Take 2 Tabs by mouth daily. Class: Print Route: Oral  
  
Prescriptions Sent to Pharmacy Refills lansoprazole (PREVACID) 30 mg capsule 3 Sig: Take 1 Cap by mouth Daily (before breakfast). Class: Normal  
 Pharmacy: [facility] [address] [place] [address] #: [phone] Route: Oral  
  
Follow-up Instructions Return in about 6 weeks (around 1/2/2018). To-Do List   
 01/21/2018 Lab:  CBC WITH AUTOMATED DIFF   
  
 01/21/2018 Lab:  HEMOGLOBIN A1C W/O EAG   
  
 01/21/2018 Lab:  LIPID PANEL   
  
 01/21/2018 Lab:  METABOLIC PANEL, COMPREHENSIVE   
  
 01/21/2018 Lab:  VITAMIN D, 25 HYDROXY Patient Instructions Please contact our office if you have any questions about your visit today. Introducing [facility]! [facility] introduces [employer] patient portal. Now you can access parts of your medical record, email your doctor's office, and request medication refills online. 1. In your internet browser, go to [email or web address] 2. Click on the First Time User? Click Here link in the Sign In box. You will see the New Member Sign Up page. 3. Enter your [employer] Access Code exactly as it appears below. You will not need to use this code after youve completed the sign-up process. If you do not sign up before the expiration date, you must request a new code. · [employer] Access Code: [email or web address] Expires: 12/20/2017  1:44 PM 
 
4. Enter the last four digits of your Social Security Number (xxxx) and Date of Birth (mm/dd/yyyy) as indicated and click Submit. You will be taken to the next sign-up page. 5. Create a Numonyxt ID. This will be your [employer] login ID and cannot be changed, so think of one that is secure and easy to remember. 6. Create a [employer] password. You can change your password at any time. 7. Enter your Password Reset Question and Answer. This can be used at a later time if you forget your password. 8. Enter your e-mail address.  You will receive e-mail notification when new information is available in [employer]. 9. Click Sign Up. You can now view and download portions of your medical record. 10. Click the Download Summary menu link to download a portable copy of your medical information. If you have questions, please visit the Frequently Asked Questions section of the [employer] website. Remember, [employer] is NOT to be used for urgent needs. For medical emergencies, dial 911. Now available from your iPhone and Android! Please provide this summary of care documentation to your next provider. Your primary care clinician is listed as SALINAS ALMARAZ. If you have any questions after today's visit, please call [phone].

## 2017-11-21 NOTE — PROGRESS NOTES
Chief Complaint   Patient presents with    Hypertension    Cholesterol Problem    Arthritis     1. Have you been to the ER, urgent care clinic since your last visit? Hospitalized since your last visit? No    2. Have you seen or consulted any other health care providers outside of the [address] [place] [address] since your last visit? Include any pap smears or colon screening.  No

## 2017-11-21 NOTE — PROGRESS NOTES
HISTORY OF PRESENT ILLNESS  Carter Eldridge is a 78 y.o. female. Chief Complaint   Patient presents with    Hypertension Chronic problem, uncontrolled Reports compliance with meds . Asymptomatic, no headache or dizziness.  Cholesterol Problem chronic problem, stable last ck    Arthritis     back hurts today, states pain medication is effective, pain score 5, states tramadol even 1/2 pkll causing her to hurt all over in a squeezing pain     Carter Eldridge RTC today to follow up on chronic pain diagnosis. We discussed her osteoarthritis and arthralgias that is affecting her back and bilateral knee. Significant changes since last visit: reaction to ultram as above. She is  able to do her normal daily activities. She reports the following adverse side effects: above. Least pain over the last week has been 5/10. Worst pain over the last week has been 5/10. Opioid Risk Tool Reviewed: YES    Aberrant behaviors: None. Urine Drug Screen: reviewed and up to date. Controlled substance agreement on file: YES.  reviewed:yes    Pill count is consistent with her prescription: yes    Concomitant use of a benzodiazepine: no             Also,  abstinence syndrome was reviewed and discussed with her today N/A    HPI  Past Medical History:   Diagnosis Date    Abnormal nuclear cardiac imaging test 2009    Moderate inferior infarction w/o ischemia. Mild anterior artifact. EF 55%. Mild inferior hypk. Neg EKG on max EST. Ex time 7:30.    Arthritis     Breast cancer [facility])     resection '06;  chemotherapy;  XRT    Coronary artery disease     RCA - 2.75 x 15mm ACS ZETA x2 5    Dyslipidemia     GERD     History of echocardiogram 2008    EF 61% (3D). No clear-cut WMA. Gr 1 DDfx. Mild LAE. Mild MR. Mild TR.   PASP 40.      Hypertension     Lung cancer [facility])     resection ';  no chemotherapy,  no XRT    S/P cardiac cath 2003    Obici:  Prox RCA mild.  Prior stent patent. LM  patent. pLAD 20%. CX patent. Current Outpatient Prescriptions   Medication Sig Dispense Refill    HYDROcodone-acetaminophen (NORCO) 5-325 mg per tablet Take 1 Tab by mouth every four (4) hours as needed for Pain. 30 Tab 0    ergocalciferol (ERGOCALCIFEROL) 50,000 unit capsule Take 1 Cap by mouth every seven (7) days. 5 Cap 6    lisinopril (PRINIVIL, ZESTRIL) 20 mg tablet Take  by mouth daily.  lansoprazole (PREVACID) 30 mg capsule Take 1 Cap by mouth Daily (before breakfast). 90 Cap 3    ezetimibe (ZETIA) 10 mg tablet Take 1 Tab by mouth daily. 90 Tab 3    nitroglycerin (NITROSTAT) 0.4 mg SL tablet 1 Tab by SubLINGual route every five (5) minutes as needed. 30 Tab 1    PARoxetine mesylate (BRISDELLE) 7.5 mg cap Take 1 Cap by mouth daily. 30 Cap 1    TOPROL XL 25 mg XL tablet Take 0.5 Tabs by mouth daily. 45 Tab 3    aspirin delayed-release 81 mg tablet Take 81 mg by mouth daily.  FERROUS FUMARATE (IRON PO) Take  by mouth two (2) times a day.  traMADol (ULTRAM) 50 mg tablet Take 1 Tab by mouth two (2) times daily as needed for Pain. 60 Tab 0     Allergies   Allergen Reactions    Ampicillin Hives    Crestor [Rosuvastatin] Myalgia    Lipitor [Atorvastatin] Myalgia    Zetia [Ezetimibe] Myalgia       Review of Systems   Respiratory: Negative for shortness of breath. Cardiovascular: Negative for chest pain. Musculoskeletal: Positive for joint pain and myalgias. Negative for falls. Visit Vitals    /80  Comment: manual    Pulse (!) 57    Temp 98.6 °F (37 °C) (Oral)    Ht 5' 6\" (1.676 m)    Wt 200 lb (90.7 kg)    SpO2 98%    BMI 32.28 kg/m2       Physical Exam  Nursing note and vitals reviewed. Constitutional: She is oriented to person, place, and time. She appears well-developed and well-nourished. No distress. HENT:   Mouth/Throat: Oropharynx is clear and moist.   Neck: No JVD present. No thyromegaly present.    Cardiovascular: Normal rate, regular rhythm and normal heart sounds. Pulmonary/Chest: Effort normal and breath sounds normal. No respiratory distress. She has no wheezes. She has no rales. Musculoskeletal: She exhibits no edema. Lymphadenopathy:     She has no cervical adenopathy. Neurological: She is alert and oriented to person, place, and time. Coordination normal.   Psychiatric: She has a normal mood and affect. Her behavior is normal.    Results for orders placed or performed in visit on 07/18/17   Shoutlet SELECT 13 (MW)   Result Value Ref Range    REPORT SUMMARY FINAL    OCCULT BLOOD, IMMUNOASSAY   Result Value Ref Range    Occult blood fecal, by IA Negative Negative      ASSESSMENT and PLAN    ICD-10-CM ICD-9-CM    1. Arthritis Reviewed  database profile, no unexpected activity. Signed narcotic contract on chart. Patient remains compliant with med, refilled today. Pt cites reaction aching to ultram will d/c and remain on norco alone   M19.90 716.90 HYDROcodone-acetaminophen (NORCO) 5-325 mg per tablet      METABOLIC PANEL, COMPREHENSIVE      CBC WITH AUTOMATED DIFF   2. Pain of multiple sites R52 780.96 HYDROcodone-acetaminophen (NORCO) 5-325 mg per tablet      METABOLIC PANEL, COMPREHENSIVE      CBC WITH AUTOMATED DIFF   3. Hypertension uncontrolled . increased lisinopril 40 mg daily Patient is asked to monitor BP at home or work, several times per month and return with written values at next office visit. T06.6 338.51 METABOLIC PANEL, COMPREHENSIVE      CBC WITH AUTOMATED DIFF      lisinopril (PRINIVIL, ZESTRIL) 20 mg tablet   4. Vitamin D deficiency B25.1 036.5 METABOLIC PANEL, COMPREHENSIVE      CBC WITH AUTOMATED DIFF      VITAMIN D, 25 HYDROXY   5. Dyslipidemia Check labs on follow up   E78.5 272.4 LIPID PANEL      METABOLIC PANEL, COMPREHENSIVE      CBC WITH AUTOMATED DIFF   6.  Elevated fasting blood sugar Check labs on follow up   W20.70 137.29 METABOLIC PANEL, COMPREHENSIVE      HEMOGLOBIN A1C W/O EAG      VITAMIN D, 25 HYDROXY   7. Gastroesophageal reflux disease without esophagitis K21.9 530.81 lansoprazole (PREVACID) 30 mg capsule      METABOLIC PANEL, COMPREHENSIVE      CBC WITH AUTOMATED DIFF   8. Medication monitoring encounter Z51.81 V58.83 LIPID PANEL      METABOLIC PANEL, COMPREHENSIVE      CBC WITH AUTOMATED DIFF      HEMOGLOBIN A1C W/O EAG   Follow-up Disposition:  Return in about 6 weeks (around 1/2/2018).

## 2017-12-06 ENCOUNTER — HOSPITAL ENCOUNTER (OUTPATIENT)
Dept: LAB | Age: 67
Discharge: HOME | End: 2017-12-06
Payer: MEDICARE

## 2017-12-06 DIAGNOSIS — E55.9 VITAMIN D DEFICIENCY: ICD-10-CM

## 2017-12-06 DIAGNOSIS — K21.9 GASTROESOPHAGEAL REFLUX DISEASE WITHOUT ESOPHAGITIS: ICD-10-CM

## 2017-12-06 DIAGNOSIS — M19.90 ARTHRITIS: ICD-10-CM

## 2017-12-06 DIAGNOSIS — Z51.81 MEDICATION MONITORING ENCOUNTER: ICD-10-CM

## 2017-12-06 DIAGNOSIS — I11.9 BENIGN HYPERTENSIVE HEART DISEASE WITHOUT HEART FAILURE: ICD-10-CM

## 2017-12-06 DIAGNOSIS — E78.5 DYSLIPIDEMIA: ICD-10-CM

## 2017-12-06 DIAGNOSIS — R73.01 ELEVATED FASTING BLOOD SUGAR: ICD-10-CM

## 2017-12-06 DIAGNOSIS — R52 PAIN OF MULTIPLE SITES: ICD-10-CM

## 2017-12-06 LAB
ALBUMIN SERPL-MCNC: 3.8 G/DL (ref 3.4–5)
ALBUMIN/GLOB SERPL: 1.1 {RATIO} (ref 0.8–1.7)
ALP SERPL-CCNC: 46 U/L (ref 45–117)
ALT SERPL-CCNC: 20 U/L (ref 13–56)
ANION GAP SERPL CALC-SCNC: 9 MMOL/L (ref 3–18)
AST SERPL-CCNC: 16 U/L (ref 15–37)
BASOPHILS # BLD: 0 K/UL (ref 0–0.06)
BASOPHILS NFR BLD: 1 % (ref 0–2)
BILIRUB SERPL-MCNC: 0.6 MG/DL (ref 0.2–1)
BUN SERPL-MCNC: 13 MG/DL (ref 7–18)
BUN/CREAT SERPL: 18 (ref 12–20)
CALCIUM SERPL-MCNC: 9.4 MG/DL (ref 8.5–10.1)
CHLORIDE SERPL-SCNC: 104 MMOL/L (ref 100–108)
CHOLEST SERPL-MCNC: 240 MG/DL
CO2 SERPL-SCNC: 28 MMOL/L (ref 21–32)
CREAT SERPL-MCNC: 0.73 MG/DL (ref 0.6–1.3)
DIFFERENTIAL METHOD BLD: ABNORMAL
EOSINOPHIL # BLD: 0.1 K/UL (ref 0–0.4)
EOSINOPHIL NFR BLD: 2 % (ref 0–5)
ERYTHROCYTE [DISTWIDTH] IN BLOOD BY AUTOMATED COUNT: 14.3 % (ref 11.6–14.5)
GLOBULIN SER CALC-MCNC: 3.6 G/DL (ref 2–4)
GLUCOSE SERPL-MCNC: 93 MG/DL (ref 74–99)
HBA1C MFR BLD: 5.5 % (ref 4.2–5.6)
HCT VFR BLD AUTO: 39.3 % (ref 35–45)
HDLC SERPL-MCNC: 82 MG/DL (ref 40–60)
HDLC SERPL: 2.9 {RATIO} (ref 0–5)
HGB BLD-MCNC: 12.6 G/DL (ref 12–16)
LDLC SERPL CALC-MCNC: 140.2 MG/DL (ref 0–100)
LIPID PROFILE,FLP: ABNORMAL
LYMPHOCYTES # BLD: 2.2 K/UL (ref 0.9–3.6)
LYMPHOCYTES NFR BLD: 39 % (ref 21–52)
MCH RBC QN AUTO: 27.7 PG (ref 24–34)
MCHC RBC AUTO-ENTMCNC: 32.1 G/DL (ref 31–37)
MCV RBC AUTO: 86.4 FL (ref 74–97)
MONOCYTES # BLD: 0.6 K/UL (ref 0.05–1.2)
MONOCYTES NFR BLD: 11 % (ref 3–10)
NEUTS SEG # BLD: 2.7 K/UL (ref 1.8–8)
NEUTS SEG NFR BLD: 47 % (ref 40–73)
PLATELET # BLD AUTO: 295 K/UL (ref 135–420)
PMV BLD AUTO: 12 FL (ref 9.2–11.8)
POTASSIUM SERPL-SCNC: 4.7 MMOL/L (ref 3.5–5.5)
PROT SERPL-MCNC: 7.4 G/DL (ref 6.4–8.2)
RBC # BLD AUTO: 4.55 M/UL (ref 4.2–5.3)
SODIUM SERPL-SCNC: 141 MMOL/L (ref 136–145)
TRIGL SERPL-MCNC: 89 MG/DL (ref ?–150)
VLDLC SERPL CALC-MCNC: 17.8 MG/DL
WBC # BLD AUTO: 5.7 K/UL (ref 4.6–13.2)

## 2017-12-06 PROCEDURE — 82306 VITAMIN D 25 HYDROXY: CPT | Performed by: FAMILY MEDICINE

## 2017-12-06 PROCEDURE — 80053 COMPREHEN METABOLIC PANEL: CPT | Performed by: FAMILY MEDICINE

## 2017-12-06 PROCEDURE — 80061 LIPID PANEL: CPT | Performed by: FAMILY MEDICINE

## 2017-12-06 PROCEDURE — 83036 HEMOGLOBIN GLYCOSYLATED A1C: CPT | Performed by: FAMILY MEDICINE

## 2017-12-06 PROCEDURE — 36415 COLL VENOUS BLD VENIPUNCTURE: CPT | Performed by: FAMILY MEDICINE

## 2017-12-06 PROCEDURE — 85025 COMPLETE CBC W/AUTO DIFF WBC: CPT | Performed by: FAMILY MEDICINE

## 2017-12-07 LAB — 25(OH)D3 SERPL-MCNC: 41.8 NG/ML (ref 30–100)

## 2018-05-22 RX ORDER — ERGOCALCIFEROL 1.25 MG/1
CAPSULE ORAL
Qty: 5 CAP | Refills: 6 | OUTPATIENT
Start: 2018-05-22